# Patient Record
Sex: FEMALE | Race: WHITE | Employment: OTHER | URBAN - METROPOLITAN AREA
[De-identification: names, ages, dates, MRNs, and addresses within clinical notes are randomized per-mention and may not be internally consistent; named-entity substitution may affect disease eponyms.]

---

## 2017-11-07 ENCOUNTER — APPOINTMENT (OUTPATIENT)
Dept: GENERAL RADIOLOGY | Age: 67
DRG: 101 | End: 2017-11-07
Payer: MEDICARE

## 2017-11-07 ENCOUNTER — HOSPITAL ENCOUNTER (INPATIENT)
Age: 67
LOS: 1 days | Discharge: HOME | DRG: 101 | End: 2017-11-08
Attending: EMERGENCY MEDICINE | Admitting: INTERNAL MEDICINE
Payer: MEDICARE

## 2017-11-07 DIAGNOSIS — G40.919 BREAKTHROUGH SEIZURE (HCC): ICD-10-CM

## 2017-11-07 DIAGNOSIS — R56.9 SEIZURES (HCC): Primary | ICD-10-CM

## 2017-11-07 DIAGNOSIS — I95.0 IDIOPATHIC HYPOTENSION: ICD-10-CM

## 2017-11-07 DIAGNOSIS — N30.00 ACUTE CYSTITIS WITHOUT HEMATURIA: ICD-10-CM

## 2017-11-07 LAB
ALBUMIN SERPL-MCNC: 4.1 G/DL (ref 3.9–4.9)
ALP BLD-CCNC: 94 U/L (ref 40–130)
ALT SERPL-CCNC: 7 U/L (ref 0–33)
AMORPHOUS: ABNORMAL
ANION GAP SERPL CALCULATED.3IONS-SCNC: 17 MEQ/L (ref 7–13)
AST SERPL-CCNC: 9 U/L (ref 0–35)
BACTERIA: ABNORMAL /HPF
BASOPHILS ABSOLUTE: 0 K/UL (ref 0–0.2)
BASOPHILS RELATIVE PERCENT: 0.5 %
BILIRUB SERPL-MCNC: 0.1 MG/DL (ref 0–1.2)
BILIRUBIN URINE: ABNORMAL
BLOOD, URINE: NEGATIVE
BUN BLDV-MCNC: 20 MG/DL (ref 8–23)
CALCIUM SERPL-MCNC: 9.9 MG/DL (ref 8.6–10.2)
CHLORIDE BLD-SCNC: 90 MEQ/L (ref 98–107)
CLARITY: ABNORMAL
CO2: 23 MEQ/L (ref 22–29)
COLOR: ABNORMAL
CREAT SERPL-MCNC: 1.16 MG/DL (ref 0.5–0.9)
CREATININE URINE: 36.2 MG/DL
EOSINOPHILS ABSOLUTE: 0.1 K/UL (ref 0–0.7)
EOSINOPHILS RELATIVE PERCENT: 1 %
EPITHELIAL CELLS, UA: ABNORMAL /HPF
GFR AFRICAN AMERICAN: 56.6
GFR NON-AFRICAN AMERICAN: 46.8
GLOBULIN: 2.2 G/DL (ref 2.3–3.5)
GLUCOSE BLD-MCNC: 179 MG/DL (ref 74–109)
GLUCOSE URINE: NEGATIVE MG/DL
HCT VFR BLD CALC: 39 % (ref 37–47)
HEMOGLOBIN: 13.3 G/DL (ref 12–16)
KETONES, URINE: ABNORMAL MG/DL
LEUKOCYTE ESTERASE, URINE: ABNORMAL
LYMPHOCYTES ABSOLUTE: 2 K/UL (ref 1–4.8)
LYMPHOCYTES RELATIVE PERCENT: 20.8 %
MCH RBC QN AUTO: 32.9 PG (ref 27–31.3)
MCHC RBC AUTO-ENTMCNC: 34.1 % (ref 33–37)
MCV RBC AUTO: 96.5 FL (ref 82–100)
MONOCYTES ABSOLUTE: 0.9 K/UL (ref 0.2–0.8)
MONOCYTES RELATIVE PERCENT: 9.3 %
MUCUS: PRESENT
NEUTROPHILS ABSOLUTE: 6.5 K/UL (ref 1.4–6.5)
NEUTROPHILS RELATIVE PERCENT: 68.4 %
NITRITE, URINE: POSITIVE
OSMOLALITY URINE: 316 MOSM/KG (ref 300–900)
OSMOLALITY: 269 MOSM/KG (ref 280–303)
PDW BLD-RTO: 12.6 % (ref 11.5–14.5)
PH UA: 6.5 (ref 5–9)
PLATELET # BLD: 245 K/UL (ref 130–400)
POTASSIUM SERPL-SCNC: 3.3 MEQ/L (ref 3.5–5.1)
POTASSIUM, UR: 13.9 MEQ/L
PROTEIN UA: 30 MG/DL
RBC # BLD: 4.04 M/UL (ref 4.2–5.4)
RBC UA: ABNORMAL /HPF (ref 0–2)
SODIUM BLD-SCNC: 130 MEQ/L (ref 132–144)
SODIUM URINE: 73 MEQ/L
SPECIFIC GRAVITY UA: 1.02 (ref 1–1.03)
TOTAL CK: 44 U/L (ref 0–170)
TOTAL PROTEIN: 6.3 G/DL (ref 6.4–8.1)
TROPONIN: <0.01 NG/ML (ref 0–0.01)
TSH SERPL DL<=0.05 MIU/L-ACNC: 0.9 UIU/ML (ref 0.27–4.2)
URINE REFLEX TO CULTURE: YES
UROBILINOGEN, URINE: 1 E.U./DL
WBC # BLD: 9.5 K/UL (ref 4.8–10.8)
WBC UA: ABNORMAL /HPF (ref 0–5)

## 2017-11-07 PROCEDURE — 71010 XR CHEST PORTABLE: CPT

## 2017-11-07 PROCEDURE — 1210000000 HC MED SURG R&B

## 2017-11-07 PROCEDURE — 99285 EMERGENCY DEPT VISIT HI MDM: CPT

## 2017-11-07 PROCEDURE — 96374 THER/PROPH/DIAG INJ IV PUSH: CPT

## 2017-11-07 PROCEDURE — 6360000002 HC RX W HCPCS: Performed by: EMERGENCY MEDICINE

## 2017-11-07 PROCEDURE — 81001 URINALYSIS AUTO W/SCOPE: CPT

## 2017-11-07 PROCEDURE — 84133 ASSAY OF URINE POTASSIUM: CPT

## 2017-11-07 PROCEDURE — 2580000003 HC RX 258: Performed by: EMERGENCY MEDICINE

## 2017-11-07 PROCEDURE — 87086 URINE CULTURE/COLONY COUNT: CPT

## 2017-11-07 PROCEDURE — 82570 ASSAY OF URINE CREATININE: CPT

## 2017-11-07 PROCEDURE — 6370000000 HC RX 637 (ALT 250 FOR IP): Performed by: INTERNAL MEDICINE

## 2017-11-07 PROCEDURE — 83930 ASSAY OF BLOOD OSMOLALITY: CPT

## 2017-11-07 PROCEDURE — 36415 COLL VENOUS BLD VENIPUNCTURE: CPT

## 2017-11-07 PROCEDURE — 6360000002 HC RX W HCPCS: Performed by: INTERNAL MEDICINE

## 2017-11-07 PROCEDURE — 87040 BLOOD CULTURE FOR BACTERIA: CPT

## 2017-11-07 PROCEDURE — 85025 COMPLETE CBC W/AUTO DIFF WBC: CPT

## 2017-11-07 PROCEDURE — 84484 ASSAY OF TROPONIN QUANT: CPT

## 2017-11-07 PROCEDURE — 84300 ASSAY OF URINE SODIUM: CPT

## 2017-11-07 PROCEDURE — 80053 COMPREHEN METABOLIC PANEL: CPT

## 2017-11-07 PROCEDURE — 95816 EEG AWAKE AND DROWSY: CPT

## 2017-11-07 PROCEDURE — 82550 ASSAY OF CK (CPK): CPT

## 2017-11-07 PROCEDURE — 84443 ASSAY THYROID STIM HORMONE: CPT

## 2017-11-07 PROCEDURE — 96375 TX/PRO/DX INJ NEW DRUG ADDON: CPT

## 2017-11-07 PROCEDURE — 93005 ELECTROCARDIOGRAM TRACING: CPT

## 2017-11-07 PROCEDURE — 83935 ASSAY OF URINE OSMOLALITY: CPT

## 2017-11-07 PROCEDURE — 6370000000 HC RX 637 (ALT 250 FOR IP): Performed by: PSYCHIATRY & NEUROLOGY

## 2017-11-07 PROCEDURE — 2500000003 HC RX 250 WO HCPCS: Performed by: INTERNAL MEDICINE

## 2017-11-07 RX ORDER — OXCARBAZEPINE 300 MG/1
300 TABLET, FILM COATED ORAL NIGHTLY
Status: DISCONTINUED | OUTPATIENT
Start: 2017-11-07 | End: 2017-11-07

## 2017-11-07 RX ORDER — OXCARBAZEPINE 300 MG/1
600 TABLET, FILM COATED ORAL DAILY
Status: DISCONTINUED | OUTPATIENT
Start: 2017-11-07 | End: 2017-11-07

## 2017-11-07 RX ORDER — SODIUM CHLORIDE 0.9 % (FLUSH) 0.9 %
10 SYRINGE (ML) INJECTION PRN
Status: DISCONTINUED | OUTPATIENT
Start: 2017-11-07 | End: 2017-11-08 | Stop reason: HOSPADM

## 2017-11-07 RX ORDER — CETIRIZINE HYDROCHLORIDE 10 MG/1
10 TABLET ORAL DAILY
Status: DISCONTINUED | OUTPATIENT
Start: 2017-11-07 | End: 2017-11-08 | Stop reason: HOSPADM

## 2017-11-07 RX ORDER — 0.9 % SODIUM CHLORIDE 0.9 %
1000 INTRAVENOUS SOLUTION INTRAVENOUS ONCE
Status: COMPLETED | OUTPATIENT
Start: 2017-11-07 | End: 2017-11-07

## 2017-11-07 RX ORDER — ONDANSETRON 2 MG/ML
INJECTION INTRAMUSCULAR; INTRAVENOUS
Status: DISPENSED
Start: 2017-11-07 | End: 2017-11-07

## 2017-11-07 RX ORDER — METOPROLOL SUCCINATE 50 MG/1
50 TABLET, EXTENDED RELEASE ORAL DAILY
COMMUNITY

## 2017-11-07 RX ORDER — DULOXETIN HYDROCHLORIDE 60 MG/1
60 CAPSULE, DELAYED RELEASE ORAL DAILY
COMMUNITY

## 2017-11-07 RX ORDER — IBUPROFEN 400 MG/1
400 TABLET ORAL EVERY 6 HOURS PRN
Status: DISCONTINUED | OUTPATIENT
Start: 2017-11-07 | End: 2017-11-08 | Stop reason: HOSPADM

## 2017-11-07 RX ORDER — METOPROLOL SUCCINATE 50 MG/1
50 TABLET, EXTENDED RELEASE ORAL DAILY
Status: DISCONTINUED | OUTPATIENT
Start: 2017-11-07 | End: 2017-11-08 | Stop reason: HOSPADM

## 2017-11-07 RX ORDER — DULOXETIN HYDROCHLORIDE 60 MG/1
60 CAPSULE, DELAYED RELEASE ORAL DAILY
Status: DISCONTINUED | OUTPATIENT
Start: 2017-11-07 | End: 2017-11-08 | Stop reason: HOSPADM

## 2017-11-07 RX ORDER — OXCARBAZEPINE 300 MG/1
600 TABLET, FILM COATED ORAL 2 TIMES DAILY
Status: DISCONTINUED | OUTPATIENT
Start: 2017-11-07 | End: 2017-11-08 | Stop reason: HOSPADM

## 2017-11-07 RX ORDER — POTASSIUM CHLORIDE 20MEQ/15ML
40 LIQUID (ML) ORAL ONCE
Status: COMPLETED | OUTPATIENT
Start: 2017-11-07 | End: 2017-11-07

## 2017-11-07 RX ORDER — ASPIRIN 81 MG/1
81 TABLET, CHEWABLE ORAL DAILY
COMMUNITY

## 2017-11-07 RX ORDER — LORAZEPAM 2 MG/ML
1 INJECTION INTRAMUSCULAR ONCE
Status: COMPLETED | OUTPATIENT
Start: 2017-11-07 | End: 2017-11-07

## 2017-11-07 RX ORDER — ONDANSETRON 2 MG/ML
4 INJECTION INTRAMUSCULAR; INTRAVENOUS ONCE
Status: COMPLETED | OUTPATIENT
Start: 2017-11-07 | End: 2017-11-07

## 2017-11-07 RX ORDER — ACETAMINOPHEN 325 MG/1
650 TABLET ORAL EVERY 6 HOURS PRN
Status: DISCONTINUED | OUTPATIENT
Start: 2017-11-07 | End: 2017-11-08 | Stop reason: HOSPADM

## 2017-11-07 RX ORDER — OXCARBAZEPINE 300 MG/1
300 TABLET, FILM COATED ORAL NIGHTLY
Status: ON HOLD | COMMUNITY
Start: 2017-11-07 | End: 2017-11-08 | Stop reason: HOSPADM

## 2017-11-07 RX ORDER — LEVETIRACETAM 500 MG/1
500 TABLET ORAL 2 TIMES DAILY
Status: DISCONTINUED | OUTPATIENT
Start: 2017-11-07 | End: 2017-11-07

## 2017-11-07 RX ORDER — SODIUM CHLORIDE 0.9 % (FLUSH) 0.9 %
10 SYRINGE (ML) INJECTION EVERY 12 HOURS SCHEDULED
Status: DISCONTINUED | OUTPATIENT
Start: 2017-11-07 | End: 2017-11-08 | Stop reason: HOSPADM

## 2017-11-07 RX ORDER — SODIUM CHLORIDE 9 MG/ML
INJECTION, SOLUTION INTRAVENOUS CONTINUOUS
Status: DISCONTINUED | OUTPATIENT
Start: 2017-11-07 | End: 2017-11-08 | Stop reason: ALTCHOICE

## 2017-11-07 RX ORDER — ATORVASTATIN CALCIUM 10 MG/1
10 TABLET, FILM COATED ORAL DAILY
Status: DISCONTINUED | OUTPATIENT
Start: 2017-11-07 | End: 2017-11-08 | Stop reason: HOSPADM

## 2017-11-07 RX ORDER — ASPIRIN 81 MG/1
81 TABLET, CHEWABLE ORAL DAILY
Status: DISCONTINUED | OUTPATIENT
Start: 2017-11-07 | End: 2017-11-08 | Stop reason: HOSPADM

## 2017-11-07 RX ORDER — ONDANSETRON 2 MG/ML
4 INJECTION INTRAMUSCULAR; INTRAVENOUS EVERY 6 HOURS PRN
Status: DISCONTINUED | OUTPATIENT
Start: 2017-11-07 | End: 2017-11-08 | Stop reason: HOSPADM

## 2017-11-07 RX ORDER — OXCARBAZEPINE 600 MG/1
600 TABLET, FILM COATED ORAL DAILY
Status: ON HOLD | COMMUNITY
End: 2017-11-08

## 2017-11-07 RX ORDER — ATORVASTATIN CALCIUM 10 MG/1
10 TABLET, FILM COATED ORAL DAILY
COMMUNITY

## 2017-11-07 RX ORDER — NICOTINE 21 MG/24HR
1 PATCH, TRANSDERMAL 24 HOURS TRANSDERMAL DAILY
Status: DISCONTINUED | OUTPATIENT
Start: 2017-11-07 | End: 2017-11-08 | Stop reason: HOSPADM

## 2017-11-07 RX ADMIN — ACETAMINOPHEN 650 MG: 325 TABLET ORAL at 22:49

## 2017-11-07 RX ADMIN — ONDANSETRON 4 MG: 2 INJECTION INTRAMUSCULAR; INTRAVENOUS at 02:20

## 2017-11-07 RX ADMIN — OXCARBAZEPINE 600 MG: 300 TABLET ORAL at 20:37

## 2017-11-07 RX ADMIN — ENOXAPARIN SODIUM 40 MG: 40 INJECTION, SOLUTION INTRAVENOUS; SUBCUTANEOUS at 09:16

## 2017-11-07 RX ADMIN — CHOLECALCIFEROL CAP 125 MCG (5000 UNIT) 5000 UNITS: 125 CAP at 09:17

## 2017-11-07 RX ADMIN — DULOXETINE HYDROCHLORIDE 60 MG: 60 CAPSULE, DELAYED RELEASE ORAL at 09:19

## 2017-11-07 RX ADMIN — OXCARBAZEPINE 600 MG: 300 TABLET ORAL at 10:53

## 2017-11-07 RX ADMIN — CETIRIZINE HYDROCHLORIDE 10 MG: 10 TABLET, FILM COATED ORAL at 15:57

## 2017-11-07 RX ADMIN — SODIUM CHLORIDE: 9 INJECTION, SOLUTION INTRAVENOUS at 02:24

## 2017-11-07 RX ADMIN — ASPIRIN 81 MG 81 MG: 81 TABLET ORAL at 09:19

## 2017-11-07 RX ADMIN — GLUCAGON HYDROCHLORIDE 1 MG: 1 INJECTION, POWDER, FOR SOLUTION INTRAMUSCULAR; INTRAVENOUS; SUBCUTANEOUS at 06:38

## 2017-11-07 RX ADMIN — SODIUM CHLORIDE 1000 ML: 9 INJECTION, SOLUTION INTRAVENOUS at 03:07

## 2017-11-07 RX ADMIN — ATORVASTATIN CALCIUM 10 MG: 10 TABLET, FILM COATED ORAL at 09:17

## 2017-11-07 RX ADMIN — CEFTRIAXONE 1 G: 1 INJECTION, POWDER, FOR SOLUTION INTRAMUSCULAR; INTRAVENOUS at 04:52

## 2017-11-07 RX ADMIN — LORAZEPAM 1 MG: 2 INJECTION INTRAMUSCULAR; INTRAVENOUS at 02:25

## 2017-11-07 RX ADMIN — SODIUM CHLORIDE: 9 INJECTION, SOLUTION INTRAVENOUS at 06:13

## 2017-11-07 RX ADMIN — POTASSIUM CHLORIDE 40 MEQ: 20 SOLUTION ORAL at 06:38

## 2017-11-07 RX ADMIN — METOPROLOL SUCCINATE 50 MG: 50 TABLET, FILM COATED, EXTENDED RELEASE ORAL at 09:18

## 2017-11-07 ASSESSMENT — PAIN SCALES - GENERAL: PAINLEVEL_OUTOF10: 6

## 2017-11-07 ASSESSMENT — ENCOUNTER SYMPTOMS
SHORTNESS OF BREATH: 0
ABDOMINAL DISTENTION: 0
ABDOMINAL PAIN: 0
PHOTOPHOBIA: 0
VOMITING: 0
WHEEZING: 0
CHEST TIGHTNESS: 0
EYE DISCHARGE: 0
NAUSEA: 1
SORE THROAT: 0
COUGH: 0

## 2017-11-07 NOTE — ED TRIAGE NOTES
Pt here for seizure. Pt was diagnosed with seizure disorder in august of this year. Pt is on seizure med tryliptal unsure of dose. Pt awake answers questions appropriately.

## 2017-11-07 NOTE — ED PROVIDER NOTES
Brittney Orozco  eMERGENCY dEPARTMENT eNCOUnter      Pt Name: Becka Hurtado  MRN: [id number]  [place] 1950  Date of evaluation: 11/7/2017  Provider: Omar Hart MD    CHIEF COMPLAINT       Chief Complaint   Patient presents with    Seizures     pt to room 3 via squad from home after having a seizure witnessed by her daughter pt was diagnosed with  seizure disorder august of 2017         HISTORY OF PRESENT ILLNESS   (Location/Symptom, Timing/Onset, Context/Setting, Quality, Duration, Modifying Factors, Severity)  Note limiting factors. Becka Hurtado is a 74 y.o. female who presents to the emergency department After having a seizure this morning. He had gotten up to give her grandbaby a bottle and didn't feel well so she laid on the floor and then had a seizure. This was witnessed by the daughter for body seizure. Patient was diagnosed the first seizure in August 2017 at full workup. She is now complaining of nausea and overall worn out    HPI    Nursing Notes were reviewed. REVIEW OF SYSTEMS    (2-9 systems for level 4, 10 or more for level 5)     Review of Systems   Constitutional: Positive for fatigue. Negative for chills and diaphoresis. HENT: Negative for congestion, ear pain, mouth sores and sore throat. Eyes: Negative for photophobia and discharge. Respiratory: Negative for cough, chest tightness, shortness of breath and wheezing. Cardiovascular: Negative for chest pain and palpitations. Gastrointestinal: Positive for nausea. Negative for abdominal distention, abdominal pain and vomiting. Endocrine: Negative for cold intolerance. Genitourinary: Negative for difficulty urinating. Musculoskeletal: Negative for arthralgias. Skin: Negative for pallor and rash. Allergic/Immunologic: Negative for immunocompromised state. Neurological: Positive for seizures. Negative for dizziness and syncope. Hematological: Negative for adenopathy.    Psychiatric/Behavioral: Negative for agitation and hallucinations. All other systems reviewed and are negative. Except as noted above the remainder of the review of systems was reviewed and negative. PAST MEDICAL HISTORY     Past Medical History:   Diagnosis Date    Depression     Hyperlipidemia     Hypertension     Seizures ([facility] [address]          SURGICAL HISTORY       Past Surgical History:   Procedure Laterality Date    COLON SURGERY           CURRENT MEDICATIONS       Discharge Medication List as of 11/8/2017 12:51 PM      CONTINUE these medications which have NOT CHANGED    Details   aspirin 81 MG chewable tablet Take 81 mg by mouth dailyHistorical Med      DULoxetine (CYMBALTA) 60 MG extended release capsule Take 60 mg by mouth dailyHistorical Med      atorvastatin (LIPITOR) 10 MG tablet Take 10 mg by mouth dailyHistorical Med      metoprolol succinate (TOPROL XL) 50 MG extended release tablet Take 50 mg by mouth dailyHistorical Med      Cholecalciferol (VITAMIN D3) 5000 units TABS Take 5,000 Units by mouth dailyHistorical Med             ALLERGIES     Percocet [oxycodone-acetaminophen]    FAMILY HISTORY     History reviewed. No pertinent family history.        SOCIAL HISTORY       Social History     Social History    Marital status:      Spouse name: N/A    Number of children: N/A    Years of education: N/A     Social History Main Topics    Smoking status: Light Tobacco Smoker    Smokeless tobacco: Never Used    Alcohol use No    Drug use: No    Sexual activity: Not Asked     Other Topics Concern    None     Social History Narrative    None       SCREENINGS   NIH Stroke Scale  NIH Stroke Scale Assessed: NoGlasgow Coma Scale  Eye Opening: Spontaneous  Best Verbal Response: Oriented  Best Motor Response: Obeys commands  [place] Coma Scale Score: 15        PHYSICAL EXAM    (up to 7 for level 4, 8 or more for level 5)     ED Triage Vitals [11/07/17 0202]   BP Temp Temp src Pulse Resp SpO2 Height Weight   (!) 94/59 -- -- 58 16 97 % 5' 6.5\" (1.689 m) 192 lb (87.1 kg)       Physical Exam   Constitutional: She is oriented to person, place, and time. She appears well-developed. No distress. HENT:   Head: Normocephalic. Nose: Nose normal.   Eyes: Conjunctivae are normal. Pupils are equal, round, and reactive to light. Neck: Normal range of motion. Neck supple. Cardiovascular: Normal rate, regular rhythm, normal heart sounds and intact distal pulses. Pulmonary/Chest: Effort normal and breath sounds normal.   Abdominal: Soft. Bowel sounds are normal. There is no tenderness. There is no guarding. Musculoskeletal: Normal range of motion. Neurological: She is alert and oriented to person, place, and time. Skin: Skin is warm and dry. Psychiatric: She has a normal mood and affect. Nursing note and vitals reviewed. DIAGNOSTIC RESULTS     EKG: All EKG's are interpreted by the Emergency Department Physician who either signs or Co-signs this chart in the absence of a cardiologist.    EKG shows sinus bradycardia rate of 58 with first-degree AV block. No acute ST or T-wave changes. There is a prolonged QT. No other acute findings. Normal axis. Abnormal EKG    RADIOLOGY:   Non-plain film images such as CT, Ultrasound and MRI are read by the radiologist. Plain radiographic images are visualized and preliminarily interpreted by the emergency physician with the below findings:    Chest x-ray is unremarkable. No signs of pneumonia    Interpretation per the Radiologist below, if available at the time of this note:    XR Chest Portable   Final Result      Right lower lung atelectasis/pneumonia.             ED BEDSIDE ULTRASOUND:   Performed by ED Physician - none    LABS:  Labs Reviewed   COMPREHENSIVE METABOLIC PANEL - Abnormal; Notable for the following:        Result Value    Sodium 130 (*)     Potassium 3.3 (*)     Chloride 90 (*)     Anion Gap 17 (*)     Glucose 179 (*)     CREATININE 1.16 (*)     GFR 11/07/17 06:17  ANTIBIOTICS AT RUPAL.:                      RECEIVED :  11/07/17 06:29   TROPONIN   CK   SODIUM, URINE, RANDOM   POTASSIUM, URINE, RANDOM   TSH WITHOUT REFLEX   OSMOLALITY, URINE   MAGNESIUM   LACTIC ACID, PLASMA   CREATININE, RANDOM URINE   CREATININE, RANDOM URINE       All other labs were within normal range or not returned as of this dictation. EMERGENCY DEPARTMENT COURSE and DIFFERENTIAL DIAGNOSIS/MDM:   Vitals:    Vitals:    11/08/17 0030 11/08/17 0346 11/08/17 0600 11/08/17 0841   BP: 132/72 (!) 142/83  (!) 155/88   Pulse: 71 66  64   Resp: 16 18  18   Temp: 98.1 °F (36.7 °C) 97.9 °F (36.6 °C)  97.9 °F (36.6 °C)   TempSrc: Oral Oral  Oral   SpO2: 96% 97%  97%   Weight:   197 lb 5 oz (89.5 kg)    Height:             ED Course      MDM on recheck patient is feeling somewhat better however her blood pressure remains in the lower range. The best blood pressure would've gotten his most recent which is 95/62. Patient's had a history of long-standing hypertension currently on metoprolol. With her complaints of dizzy and lightheaded feeling at home and then persistent hypotension without other etiology recommendation is to stop her metoprolol and admit her to the hospital looking for a clinical response. Patient does have urinary tract infection but no signs of sepsis. They'll also be treated in the hospital    Critical care time this patient is 38 minutes excluding separate billable procedures. CONSULTS:  IP CONSULT TO SPIRITUAL SERVICES  IP CONSULT TO NEUROLOGY    PROCEDURES:  Unless otherwise noted below, none     Procedures    FINAL IMPRESSION      1. Seizures ([address]    2. Acute cystitis without hematuria    3. Idiopathic hypotension    4.  Breakthrough seizure [facility])          DISPOSITION/PLAN   DISPOSITION Admitted    PATIENT REFERRED TO:  Cheikh Ge DO  [facility] [address]  [phone]    Schedule an appointment as soon as possible for a visit in 1 days  Post hospital follow up    Efrain Singh MD  [address] [place] [address]  [phone]    Call in 1 week  For follow up      Abundio5 Irma Sanches:  Discharge Medication List as of 11/8/2017 12:51 PM      START taking these medications    Details   ciprofloxacin (CIPRO) 500 MG tablet Take 1 tablet by mouth 2 times daily for 3 days, Disp-6 tablet, R-0Normal                (Please note that portions of this note were completed with a voice recognition program.  Efforts were made to edit the dictations but occasionally words are mis-transcribed.)    Josseline Schmidt MD (electronically signed)  Attending Emergency Physician          Josseline Schmidt MD  11/07/17 1770       Josseline Schmidt MD  11/07/17 MD Myesha  11/19/17 9032

## 2017-11-07 NOTE — ED NOTES
Seizure precautions maintained padded side rails intact     Oly Keller, [address] [place] [address]  11/07/17 9776

## 2017-11-07 NOTE — CONSULTS
Consult to Neurology  Consult performed by: Nalini Aquino  Consult ordered by: Amanda Collier  Seizures  Seen in [address] [place] [address]  DR Albino Gifford in [place], obtained 7 day home vidoe eeg, results not available as yet  Plan increase Trileptal and d/c tomorrow  Watch Na,   If remains low will need to change  to Lamictal,

## 2017-11-07 NOTE — CONSULTS
[address]                     [address], [address] [facility]                                 CONSULTATION    PATIENT NAME: Moira Arroyo                   :         1950  MED REC NO:   [id number]                            ROOM:       H450  ACCOUNT NO:   [de-identified]                           ADMIT DATE:  2017  PROVIDER:     Ayaz Chavez MD    CONSULT DATE:  2017    ATTENDING:  Dr. Lois Connors. HISTORY OF PRESENT ILLNESS:  This is a 73-year-old right-handed female  with history of seizure disorder. The patient was initially admitted  to [facility] in August with seizure. She has had few  seizures, first seizure was six years ago. There was no _____. She  did not seek any attention at that time. She has only been on  Trileptal since August.  She was seen by Dr. Ivy Rojas. She had a 7-day  video monitor recording at home, the results are not available; this  was done two weeks ago. She did try to see me, but there was no  appointment. Her description was that for grand mal seizure is the  daughter had witnessed this. She has complete evaluation including  MRIs and EEGs done and therefore, we will not repeat at this time. She denies any recent falls, injuries, trauma. She is not complaining  of bleeding, bruising, choking, drooling, falls. She is not  complaining of any chest pain, shortness of breath. Not complaining  of headache. No fever. She is not complaining of any chest pain. She has no other history. PAST MEDICAL HISTORY:  Remarkable for seizure as described very well  in my note. There is no other past medical history listed. SOCIAL HISTORY:  She lives at home. MEDICATIONS:  She is on aspirin and atorvastatin. She is on  metoprolol, likely representing that she has hypertension. PHYSICAL EXAMINATION:  GENERAL:  She is in no acute distress. General examination reveals no  skin lesions or skin marks.   NECK: Supple. There are no meningeal signs. CARDIOVASCULAR SYSTEM:  S1 and S2 are normal.  No murmurs appreciated. No carotid bruits. RESPIRATORY SYSTEM:  Clear to auscultation. EXTREMITIES:  There is no pedal edema. There is no cyanosis or  clubbing. Examination of extremities reveals no pronator drift. Fine  finger movements are symmetrical.  Strength is 5/5. Reflexes are 2+. Gait and stance are normal.  CNS:  She is awake and alert and oriented to self, place, month, and  year. Pupils are equal and reactive. Eye movements are conjugate. There is no facial asymmetry. Speech is normal.  Tongue is midline. Palate moves symmetrically. LABORATORY DATA:  Lab examination is therefore reviewed. Her initial  comprehensive metabolic profile shows sodium 130, potassium 3.3, BUN  of 20, creatinine 1.16. Her prolactin level was 42 and elevated. Her  B12 levels done in 2016 were low. IMPRESSION:  Generalized seizures which have been recently diagnosed. She was started on Trileptal in August which was further titrated to  900 mg.  I recommend that we change it to 1200 mg. The patient does  have some hyponatremia which needs to be followed. This is a common  side effect of Trileptal.  If this continues, the patient may need to  be changed to Lamictal.  She is followed by Dr. Joe Sanabria at [facility].  She had a 7-day video monitoring at home, the results are  not available. This was done two weeks ago. She had a complete  evaluation in [place] in August and therefore, we will not repeat any  testing. We will keep her on this medication and if she has recurrent  seizures, she will best be treated with Lamictal.    We will follow with you. Thank you Dr. Lalitha Mcclellan for asking us to assist in the care of this  patient.         Chaz Arredondo MD    D: 11/07/2017 8:46:15       T: 11/07/2017 9:44:39     [email or web address]  Job#: [id number]     Doc#: [id number]

## 2017-11-07 NOTE — PROGRESS NOTES
Hospitalist Progress Note      PCP: Bee Hinojosa DO    Date of Admission: 2017      Subjective:     Medications:  Reviewed    Infusion Medications    sodium chloride 100 mL/hr at 17 8779     Scheduled Medications    sodium chloride flush  10 mL Intravenous 2 times per day    enoxaparin  40 mg Subcutaneous Daily    [START ON 2017] pneumococcal 13-valent conjugate  0.5 mL Intramuscular Once    nicotine  1 patch Transdermal Daily    [START ON 2017] cefTRIAXone (ROCEPHIN) IV  1 g Intravenous Q24H    aspirin  81 mg Oral Daily    atorvastatin  10 mg Oral Daily    vitamin D  5,000 Units Oral Daily    DULoxetine  60 mg Oral Daily    metoprolol succinate  50 mg Oral Daily    OXcarbazepine  600 mg Oral BID     PRN Meds: sodium chloride flush, magnesium hydroxide, ondansetron, ibuprofen      Intake/Output Summary (Last 24 hours) at 17 1110  Last data filed at 17 0408   Gross per 24 hour   Intake             2000 ml   Output                0 ml   Net             2000 ml       Exam:  /63   Pulse 75   Temp 97.9 °F (36.6 °C) (Oral)   Resp 18   Ht 5' 6.5\" (1.689 m)   Wt 192 lb (87.1 kg)   SpO2 98%   BMI 30.53 kg/m²     Average, Min, and Max for last 24 hours Vitals:  TEMPERATURE:  Temp  Av.6 °F (36.4 °C)  Min: 97.5 °F (36.4 °C)  Max: 97.9 °F (36.6 °C)    RESPIRATIONS RANGE: Resp  Av.3  Min: 15  Max: 21    PULSE RANGE: Pulse  Av.9  Min: 58  Max: 75    BLOOD PRESSURE RANGE:  Systolic (82ZSS), XHO:81 , Min:90 , YB   ; Diastolic (39AUX), JASON:58, Min:53, Max:66      PULSE OXIMETRY RANGE: SpO2  Av.2 %  Min: 97 %  Max: 100 %    I/O last 3 completed shifts: In:  [I.V.:]  Out: -     General appearance: No apparent distress, appears stated age and cooperative. HEENT: Pupils equal, round, and reactive to light. Conjunctivae/corneas clear. Neck: Supple, with full range of motion. No jugular venous distention. Trachea midline.   Respiratory:  Normal will consider discontinuation of these medications. Administering NSIV, monitoring closely. 5. Hypokalemia: replete and monitor   6. DVT PPX lovenox SQ 40mg. 7.  BIPOLAR D/O continue trileptal and cymbalta.       DVT Prophylaxis: via lovenox  Diet: DIET GENERAL;  Code Status: Full Code      Electronically signed by Ariel Montesinos MD on 11/7/2017 at 11:10 AM

## 2017-11-07 NOTE — H&P
Historical Provider, MD   DULoxetine (CYMBALTA) 60 MG extended release capsule Take 60 mg by mouth daily   Yes Historical Provider, MD   atorvastatin (LIPITOR) 10 MG tablet Take 10 mg by mouth daily   Yes Historical Provider, MD   metoprolol succinate (TOPROL XL) 50 MG extended release tablet Take 50 mg by mouth daily   Yes Historical Provider, MD   OXcarbazepine (TRILEPTAL) 600 MG tablet Take 600 mg by mouth daily   Yes Historical Provider, MD   Cholecalciferol (VITAMIN D3) 5000 units TABS Take 5,000 Units by mouth daily 11/7/17  Yes Historical Provider, MD       Allergies:  Percocet [oxycodone-acetaminophen]    Social History:   TOBACCO:   reports that she has been smoking. She has never used smokeless tobacco.  Continues to smoke  ETOH:   reports that she does not drink alcohol. OCCUPATION:  None  Lives at home. Lives at. home. Travel none. Family History:   History reviewed. No pertinent family history. REVIEW OF SYSTEMS:  Ten systems reviewed and negative except for as above. Physical Exam:    Vitals: BP 96/66   Pulse 67   Temp 97.5 °F (36.4 °C) (Oral)   Resp 16   Ht 5' 6.5\" (1.689 m)   Wt 192 lb (87.1 kg)   SpO2 99%   BMI 30.53 kg/m²   Constitutional: alert, appears stated age and cooperative  Skin: Skin color, texture, turgor normal. No rashes or lesions. Bilateral digital clubbing of the fingers  Eyes:Eye: Normal external eye, conjunctiva, HIREN. ENT: Head: Normocephalic, no lesions, without obvious abnormality.   Neck: no adenopathy, no carotid bruit, no JVD, supple, symmetrical, trachea midline and thyroid not enlarged, symmetric, no tenderness/mass/nodules  Respiratory: clear to auscultation bilaterally  Cardiovascular: regular rate and rhythm, S1, S2 normal, no murmur, click, rub or gallop  Gastrointestinal: soft, non-tender; bowel sounds normal; no masses,  no organomegaly  Genitourinary: Deferred  Musculoskeletal:extremities normal, atraumatic, no cyanosis or edema  Neurologic:

## 2017-11-08 VITALS
SYSTOLIC BLOOD PRESSURE: 155 MMHG | HEART RATE: 64 BPM | BODY MASS INDEX: 30.97 KG/M2 | HEIGHT: 67 IN | WEIGHT: 197.31 LBS | OXYGEN SATURATION: 97 % | TEMPERATURE: 97.9 F | RESPIRATION RATE: 18 BRPM | DIASTOLIC BLOOD PRESSURE: 88 MMHG

## 2017-11-08 LAB
ANION GAP SERPL CALCULATED.3IONS-SCNC: 12 MEQ/L (ref 7–13)
BASOPHILS ABSOLUTE: 0 K/UL (ref 0–0.2)
BASOPHILS RELATIVE PERCENT: 0.7 %
BUN BLDV-MCNC: 7 MG/DL (ref 8–23)
CALCIUM SERPL-MCNC: 8.3 MG/DL (ref 8.6–10.2)
CHLORIDE BLD-SCNC: 101 MEQ/L (ref 98–107)
CO2: 21 MEQ/L (ref 22–29)
CREAT SERPL-MCNC: 0.42 MG/DL (ref 0.5–0.9)
EKG ATRIAL RATE: 58 BPM
EKG ATRIAL RATE: 70 BPM
EKG P AXIS: 53 DEGREES
EKG P AXIS: 54 DEGREES
EKG P-R INTERVAL: 198 MS
EKG P-R INTERVAL: 224 MS
EKG Q-T INTERVAL: 416 MS
EKG Q-T INTERVAL: 500 MS
EKG QRS DURATION: 104 MS
EKG QRS DURATION: 90 MS
EKG QTC CALCULATION (BAZETT): 449 MS
EKG QTC CALCULATION (BAZETT): 490 MS
EKG R AXIS: -3 DEGREES
EKG R AXIS: -3 DEGREES
EKG T AXIS: 43 DEGREES
EKG T AXIS: 44 DEGREES
EKG VENTRICULAR RATE: 58 BPM
EKG VENTRICULAR RATE: 70 BPM
EOSINOPHILS ABSOLUTE: 0.1 K/UL (ref 0–0.7)
EOSINOPHILS RELATIVE PERCENT: 1.2 %
GFR AFRICAN AMERICAN: >60
GFR NON-AFRICAN AMERICAN: >60
GLUCOSE BLD-MCNC: 85 MG/DL (ref 74–109)
HCT VFR BLD CALC: 34.9 % (ref 37–47)
HEMOGLOBIN: 11.8 G/DL (ref 12–16)
LACTIC ACID: 0.6 MMOL/L (ref 0.5–2.2)
LYMPHOCYTES ABSOLUTE: 1.9 K/UL (ref 1–4.8)
LYMPHOCYTES RELATIVE PERCENT: 42.8 %
MAGNESIUM: 1.9 MG/DL (ref 1.7–2.3)
MCH RBC QN AUTO: 32.3 PG (ref 27–31.3)
MCHC RBC AUTO-ENTMCNC: 33.8 % (ref 33–37)
MCV RBC AUTO: 95.8 FL (ref 82–100)
MONOCYTES ABSOLUTE: 0.5 K/UL (ref 0.2–0.8)
MONOCYTES RELATIVE PERCENT: 10.9 %
NEUTROPHILS ABSOLUTE: 2 K/UL (ref 1.4–6.5)
NEUTROPHILS RELATIVE PERCENT: 44.4 %
PDW BLD-RTO: 13.4 % (ref 11.5–14.5)
PLATELET # BLD: 208 K/UL (ref 130–400)
POTASSIUM SERPL-SCNC: 4.1 MEQ/L (ref 3.5–5.1)
RBC # BLD: 3.64 M/UL (ref 4.2–5.4)
SODIUM BLD-SCNC: 134 MEQ/L (ref 132–144)
URINE CULTURE, ROUTINE: NORMAL
WBC # BLD: 4.5 K/UL (ref 4.8–10.8)

## 2017-11-08 PROCEDURE — 6360000002 HC RX W HCPCS: Performed by: INTERNAL MEDICINE

## 2017-11-08 PROCEDURE — 2700000000 HC OXYGEN THERAPY PER DAY

## 2017-11-08 PROCEDURE — 6370000000 HC RX 637 (ALT 250 FOR IP): Performed by: PSYCHIATRY & NEUROLOGY

## 2017-11-08 PROCEDURE — 85025 COMPLETE CBC W/AUTO DIFF WBC: CPT

## 2017-11-08 PROCEDURE — 93005 ELECTROCARDIOGRAM TRACING: CPT

## 2017-11-08 PROCEDURE — 2580000003 HC RX 258: Performed by: EMERGENCY MEDICINE

## 2017-11-08 PROCEDURE — 2580000003 HC RX 258: Performed by: INTERNAL MEDICINE

## 2017-11-08 PROCEDURE — 80048 BASIC METABOLIC PNL TOTAL CA: CPT

## 2017-11-08 PROCEDURE — 90670 PCV13 VACCINE IM: CPT | Performed by: INTERNAL MEDICINE

## 2017-11-08 PROCEDURE — 36415 COLL VENOUS BLD VENIPUNCTURE: CPT

## 2017-11-08 PROCEDURE — 6370000000 HC RX 637 (ALT 250 FOR IP): Performed by: INTERNAL MEDICINE

## 2017-11-08 PROCEDURE — 83605 ASSAY OF LACTIC ACID: CPT

## 2017-11-08 PROCEDURE — 83735 ASSAY OF MAGNESIUM: CPT

## 2017-11-08 PROCEDURE — 93010 ELECTROCARDIOGRAM REPORT: CPT | Performed by: INTERNAL MEDICINE

## 2017-11-08 PROCEDURE — G0009 ADMIN PNEUMOCOCCAL VACCINE: HCPCS | Performed by: INTERNAL MEDICINE

## 2017-11-08 RX ORDER — CIPROFLOXACIN 500 MG/1
500 TABLET, FILM COATED ORAL 2 TIMES DAILY
Qty: 6 TABLET | Refills: 0 | Status: SHIPPED | OUTPATIENT
Start: 2017-11-08 | End: 2017-11-11

## 2017-11-08 RX ORDER — OXCARBAZEPINE 600 MG/1
600 TABLET, FILM COATED ORAL 2 TIMES DAILY
Qty: 60 TABLET | Refills: 1 | Status: SHIPPED | OUTPATIENT
Start: 2017-11-08

## 2017-11-08 RX ADMIN — METOPROLOL SUCCINATE 50 MG: 50 TABLET, FILM COATED, EXTENDED RELEASE ORAL at 12:32

## 2017-11-08 RX ADMIN — DULOXETINE HYDROCHLORIDE 60 MG: 60 CAPSULE, DELAYED RELEASE ORAL at 09:01

## 2017-11-08 RX ADMIN — OXCARBAZEPINE 600 MG: 300 TABLET ORAL at 09:14

## 2017-11-08 RX ADMIN — CETIRIZINE HYDROCHLORIDE 10 MG: 10 TABLET, FILM COATED ORAL at 09:01

## 2017-11-08 RX ADMIN — SODIUM CHLORIDE: 9 INJECTION, SOLUTION INTRAVENOUS at 01:02

## 2017-11-08 RX ADMIN — ENOXAPARIN SODIUM 40 MG: 40 INJECTION, SOLUTION INTRAVENOUS; SUBCUTANEOUS at 09:00

## 2017-11-08 RX ADMIN — CHOLECALCIFEROL CAP 125 MCG (5000 UNIT) 5000 UNITS: 125 CAP at 09:14

## 2017-11-08 RX ADMIN — PNEUMOCOCCAL 13-VALENT CONJUGATE VACCINE 0.5 ML: 2.2; 2.2; 2.2; 2.2; 2.2; 4.4; 2.2; 2.2; 2.2; 2.2; 2.2; 2.2; 2.2 INJECTION, SUSPENSION INTRAMUSCULAR at 12:44

## 2017-11-08 RX ADMIN — ASPIRIN 81 MG 81 MG: 81 TABLET ORAL at 09:01

## 2017-11-08 RX ADMIN — CEFTRIAXONE 1 G: 1 INJECTION, POWDER, FOR SOLUTION INTRAMUSCULAR; INTRAVENOUS at 06:00

## 2017-11-08 NOTE — PROGRESS NOTES
Neurology Follow up    SUBJECTIVE:  NO Headache, double vision,blurry vision,difficulty with speech,difficulty with swallowing,weakness,numbness,pain,nausea,vomitting,chocking,neck pain,dizziness,    PHYSICAL EXAM:    BP (!) 142/83   Pulse 66   Temp 97.9 °F (36.6 °C) (Oral)   Resp 18   Ht 5' 6.5\" (1.689 m)   Wt 197 lb 5 oz (89.5 kg)   SpO2 97%   BMI 31.37 kg/m²   General Appearance:      Mental Status Exam:             Level of Alertness:   awake            Orientation:   person, place, time            Memory:   normal            Fund of Knowledge:  normal            Attention/Concentration:  normal            Language:  normal      Funduscopic Exam:     Cranial Nerves        Cranial nerve II           Visual acuity:  normal           Visual fields:  normal      Cranial nerve III           Pupils:  equal, round, reactive to light      Cranial nerves III, IV, VI           Extraocular Movements: intact      Cranial nerve V           Facial sensation:  intact      Cranial nerve VII           Facial strength: intact      Cranial nerve VIII           Hearing:  intact      Cranial nerve IX           Palate:  intact      Cranial nerve XI         Shoulder shrug:  intact      Cranial nerve XII          Tongue movement:  normal    Motor:    Drift:  absent  Motor exam is symmetrical 5 out of 5 all extremities bilaterally  Tone:  normal  Abnormal Movements:  absent            Sensory:        Pinprick             Right Upper Extremity:  normal             Left Upper Extremity:  normal             Right Lower Extremity:  normal             Left Lower Extremity:  normal           Vibration                         Touch            Proprioception                 Coordination:           Finger/Nose   Right:  normal              Left:  normal          Heel-Knee-Shin                Right:  normal              Left:  normal          Rapid Alternating Movements              Right:  normal              Left:  normal          Gait: Casual:  normal                         Romberg:  normal            Reflexes:             Deep Tendon Reflexes:             Reflexes are 2 +             Plantar response:                Right:  downgoing               Left:  downgoing    Vascular:  Cardiac Exam:  normal         Xr Chest Portable    Result Date: 11/7/2017  Chest one view. HISTORY: Seizure, hypertension. FINDINGS: No prior imaging. Narrowing left acromioclavicular joint. Cardiopericardial silhouette normal for technique. Aorta calcified. An ill-defined area of increased opacity is found at the right lung base. Possible suggestion of air bronchogram. Left lung is clear. Right lower lung atelectasis/pneumonia. Recent Labs      11/07/17   0231  11/08/17   0548   WBC  9.5  4.5*   HGB  13.3  11.8*   PLT  245  208     Recent Labs      11/07/17   0230  11/08/17   0548   NA  130*  134   K  3.3*  4.1   CL  90*  101   CO2  23  21*   BUN  20  7*   CREATININE  1.16*  0.42*   GLUCOSE  179*  85     Recent Labs      11/07/17   0230   BILITOT  0.1   ALKPHOS  94   AST  9   ALT  7     No results found for: PROTIME, INR  No results found for: LITHIUM, DILFRTOT, VALPROATE    ASSESSMENT AND PLAN  Gen.  Seizures, controlled   Trileptal increased  OK d/c   Will f/u Dr Maria Elena Lechuga or us   hyponaremia

## 2017-11-08 NOTE — CARE COORDINATION
SPOKE WITH PATIENT TO VERIFY HER D/C PLAN OF HOME. SHE IS ANTICIPATING HOME TODAY. PAGE 2 OF IMM INITIALED PER PATIENT WITH VERBALIZATION OF UNDERSTANDING. SHE DENIES NEEDS ON D/C.  Electronically signed by Fany Underwood RN on 11/8/2017 at 10:45 AM

## 2017-11-08 NOTE — PROCEDURES
[address]                     [facility], [address] [facility]                         ELECTROENCEPHALOGRAM REPORT    PATIENT NAME: Jovana Barlow                   :         1950  MED REC NO:   [id number]                            ROOM:       E487  ACCOUNT NO:   [de-identified]                           ADMIT DATE:  2017  PROVIDER:     Raven Lopez MD    DATE OF EE2017    ATTENDING:  Dr. Leticia Jensen. HISTORY OF PRESENT ILLNESS:  This is a 56-year-old right-handed female  with a known history of seizure disorder who was admitted with  seizures. The background rhythm of this EEG shows 8 to 9 Hz posterior dominant  rhythm of alpha. This is symmetrical and attenuates with eye opening. EKG is monitored; this is regular. Photic stimulation is performed  without any photic response. There is no photic response to seizures. A drip artifact is seen. Hyperventilation is performed with good  effort with some respiratory artifacts. The record remains  symmetrical throughout. IMPRESSION:  This is a normal well-regulated interictal EEG. Clinical  course is recommended.         Humberto Garibay MD    D: 2017 12:27:35       T: 2017 12:54:21     [email or web address]  Job#: [id number]     Doc#: [id number]

## 2017-11-08 NOTE — PLAN OF CARE
Demonstration of wound healing without infection will improve  Demonstration of wound healing without infection will improve  Outcome: Ongoing    Goal: Absence of urinary tract infection signs and symptoms  Absence of urinary tract infection signs and symptoms  Outcome: Ongoing      Problem: Nutrition Deficit - Risk of:  Goal: Ability to achieve adequate nutritional intake will improve  Ability to achieve adequate nutritional intake will improve  Outcome: Ongoing    Goal: Maintenance of adequate weight for body size and type will improve to within specified parameters  Maintenance of adequate weight for body size and type will improve to within specified parameters  Outcome: Ongoing      Problem: Swallowing - Impaired:  Goal: Able to swallow without choking  Able to swallow without choking  Outcome: Ongoing    Goal: Absence of aspiration  Absence of aspiration  Outcome: Ongoing      Problem: Aspiration - Risk of:  Goal: Absence of aspiration  Absence of aspiration  Outcome: Ongoing      Problem: Respiratory Function - Compromised:  Goal: Absence of pulmonary infection  Absence of pulmonary infection  Outcome: Ongoing    Goal: Levels of oxygenation will improve  Levels of oxygenation will improve  Outcome: Ongoing    Goal: Increase in ventilator-free time  Increase in ventilator-free time  Outcome: Ongoing    Goal: Ability to maintain a clear airway will improve  Ability to maintain a clear airway will improve  Outcome: Ongoing      Problem:  Bowel Function - Altered:  Goal: Bowel elimination is within specified parameters  Bowel elimination is within specified parameters  Outcome: Ongoing    Goal: Control of bowel function will improve  Control of bowel function will improve  Outcome: Ongoing    Goal: Ability to maintain normal bowel function will improve  Ability to maintain normal bowel function will improve  Outcome: Ongoing      Problem: Urinary Elimination - Impaired:  Goal: Urinary elimination within specified parameters  Urinary elimination within specified parameters  Outcome: Ongoing    Goal: Absence of postvoid residual urine  Absence of postvoid residual urine  Outcome: Ongoing    Goal: Absence of incontinence - Urinary  Absence of incontinence - Urinary  Outcome: Ongoing    Goal: Reports of episodes of incontinence will decrease - Urinary  Reports of episodes of incontinence will decrease - Urinary  Outcome: Ongoing      Problem: Self-Care Deficit:  Goal: Ability to perform activities of daily living will improve  Ability to perform activities of daily living will improve  Outcome: Ongoing    Goal: Able to perform ADL with assistance  Able to perform ADL with assistance  Outcome: Ongoing    Goal: Ability to communicate needs accurately will improve - ADL needs  Ability to communicate needs accurately will improve - ADL needs  Outcome: Ongoing    Goal: Able to use self-care assistive device appropriately  Able to use self-care assistive device appropriately  Outcome: Ongoing      Problem: Sexual Dysfunction:  Goal: Expressions of positive opinion of body image will increase  Expressions of positive opinion of body image will increase  Outcome: Ongoing      Problem: Psychosocial Distress:  Goal: Absence of psychosocial distress  Absence of psychosocial distress  Outcome: Ongoing    Goal: Ability to cope will improve  Ability to cope will improve  Outcome: Ongoing    Goal: Ability to identify and utilize available support systems will improve  Ability to identify and utilize available support systems will improve  Outcome: Ongoing      Problem: Disruptive Behavior:  Goal: Knowledge of developmental care interventions  Absence of violence  Outcome: Ongoing    Goal: Decrease in feelings of agitation  Decrease in feelings of agitation  Outcome: Ongoing

## 2017-11-08 NOTE — DISCHARGE SUMMARY
daily for 3 days             DULoxetine (CYMBALTA) 60 MG extended release capsule  Take 60 mg by mouth daily             metoprolol succinate (TOPROL XL) 50 MG extended release tablet  Take 50 mg by mouth daily             OXcarbazepine (TRILEPTAL) 600 MG tablet  Take 1 tablet by mouth 2 times daily             OXcarbazepine (TRILEPTAL) 600 MG tablet  Take 1 tablet by mouth 2 times daily                 Activity: activity as tolerated    Diet: 2 g sodium diet.     Wound Care: none needed    Follow-up:  As directed    Electronically signed by Isaac Navarro MD on 11/8/17 at 6:02 PM

## 2017-11-08 NOTE — PROGRESS NOTES
Hospitalist Progress Note      PCP: Saint Holster, DO    Date of Admission: 2017    Subjective: no complaints voiced    Medications:  Reviewed    Infusion Medications      Scheduled Medications    sodium chloride flush  10 mL Intravenous 2 times per day    enoxaparin  40 mg Subcutaneous Daily    pneumococcal 13-valent conjugate  0.5 mL Intramuscular Once    nicotine  1 patch Transdermal Daily    cefTRIAXone (ROCEPHIN) IV  1 g Intravenous Q24H    aspirin  81 mg Oral Daily    atorvastatin  10 mg Oral Daily    vitamin D  5,000 Units Oral Daily    DULoxetine  60 mg Oral Daily    metoprolol succinate  50 mg Oral Daily    OXcarbazepine  600 mg Oral BID    cetirizine  10 mg Oral Daily     PRN Meds: sodium chloride flush, magnesium hydroxide, ondansetron, ibuprofen, acetaminophen      Intake/Output Summary (Last 24 hours) at 17 1112  Last data filed at 17 0601   Gross per 24 hour   Intake             1785 ml   Output                0 ml   Net             1785 ml       Exam:  BP (!) 155/88   Pulse 64   Temp 97.9 °F (36.6 °C) (Oral)   Resp 18   Ht 5' 6.5\" (1.689 m)   Wt 197 lb 5 oz (89.5 kg)   SpO2 97%   BMI 31.37 kg/m²     Average, Min, and Max for last 24 hours Vitals:  TEMPERATURE:  Temp  Av.2 °F (36.8 °C)  Min: 97.9 °F (36.6 °C)  Max: 98.8 °F (37.1 °C)    RESPIRATIONS RANGE: Resp  Av.7  Min: 16  Max: 18    PULSE RANGE: Pulse  Av.3  Min: 64  Max: 75    BLOOD PRESSURE RANGE:  Systolic (60RSV), USR:067 , Min:115 , SCW:602   ; Diastolic (77ZBJ), JRO:78, Min:72, Max:88      PULSE OXIMETRY RANGE: SpO2  Av.8 %  Min: 96 %  Max: 100 %    I/O last 3 completed shifts: In: 2145 [P.O.:1080; I.V.:1065]  Out: -     General appearance: No apparent distress, appears stated age and cooperative. HEENT: Pupils equal, round, and reactive to light. Conjunctivae/corneas clear. Neck: Supple, with full range of motion. No jugular venous distention. Trachea midline.   Respiratory:  Normal respiratory effort. Clear to auscultation, bilaterally without Rales/Wheezes/Rhonchi. Cardiovascular: Regular rate and rhythm with normal S1/S2 without murmurs, rubs or gallops. Abdomen: Soft, non-tender, non-distended with normal bowel sounds. Musculoskeletal: No clubbing, cyanosis or edema bilaterally. Full range of motion without deformity. Skin: Skin color, texture, turgor normal.  No rashes or lesions.   Neurologic:  grossly non-focal.  Ext : no c/c/e    Labs:     Recent Results (from the past 24 hour(s))   Sodium, urine, random    Collection Time: 11/07/17  6:57 PM   Result Value Ref Range    Sodium, Ur 73 Not Established mEq/L   POTASSIUM, URINE, RANDOM    Collection Time: 11/07/17  6:57 PM   Result Value Ref Range    Potassium, Ur 13.9 Not Established mEq/L   OSMOLALITY, URINE    Collection Time: 11/07/17  6:57 PM   Result Value Ref Range    Osmolality, Ur 316 300 - 900 mOsm/kg   Creatinine, Random Urine    Collection Time: 11/07/17  6:57 PM   Result Value Ref Range    Creatinine, Ur 36.2 Not Established mg/dL   EKG 12 Lead    Collection Time: 11/08/17  4:58 AM   Result Value Ref Range    Ventricular Rate 70 BPM    Atrial Rate 70 BPM    P-R Interval 198 ms    QRS Duration 90 ms    Q-T Interval 416 ms    QTc Calculation (Bazett) 449 ms    P Axis 53 degrees    R Axis -3 degrees    T Axis 44 degrees   CBC auto differential    Collection Time: 11/08/17  5:48 AM   Result Value Ref Range    WBC 4.5 (L) 4.8 - 10.8 K/uL    RBC 3.64 (L) 4.20 - 5.40 M/uL    Hemoglobin 11.8 (L) 12.0 - 16.0 g/dL    Hematocrit 34.9 (L) 37.0 - 47.0 %    MCV 95.8 82.0 - 100.0 fL    MCH 32.3 (H) 27.0 - 31.3 pg    MCHC 33.8 33.0 - 37.0 %    RDW 13.4 11.5 - 14.5 %    Platelets 498 570 - 062 K/uL    Neutrophils % 44.4 %    Lymphocytes % 42.8 %    Monocytes % 10.9 %    Eosinophils % 1.2 %    Basophils % 0.7 %    Neutrophils # 2.0 1.4 - 6.5 K/uL    Lymphocytes # 1.9 1.0 - 4.8 K/uL    Monocytes # 0.5 0.2 - 0.8 K/uL    Eosinophils # 0.1 0.0 -

## 2017-11-12 LAB
BLOOD CULTURE, ROUTINE: NORMAL
CULTURE, BLOOD 2: NORMAL

## 2022-08-11 ENCOUNTER — APPOINTMENT (OUTPATIENT)
Dept: CT IMAGING | Age: 72
End: 2022-08-11
Payer: COMMERCIAL

## 2022-08-11 ENCOUNTER — HOSPITAL ENCOUNTER (EMERGENCY)
Age: 72
Discharge: HOME | End: 2022-08-11
Attending: STUDENT IN AN ORGANIZED HEALTH CARE EDUCATION/TRAINING PROGRAM
Payer: COMMERCIAL

## 2022-08-11 ENCOUNTER — APPOINTMENT (OUTPATIENT)
Dept: GENERAL RADIOLOGY | Age: 72
End: 2022-08-11
Payer: COMMERCIAL

## 2022-08-11 VITALS
RESPIRATION RATE: 15 BRPM | HEIGHT: 66 IN | SYSTOLIC BLOOD PRESSURE: 104 MMHG | OXYGEN SATURATION: 98 % | BODY MASS INDEX: 31.34 KG/M2 | WEIGHT: 195 LBS | HEART RATE: 70 BPM | DIASTOLIC BLOOD PRESSURE: 75 MMHG | TEMPERATURE: 98.7 F
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
## 2022-08-11 DIAGNOSIS — E86.0 DEHYDRATION: ICD-10-CM

## 2022-08-11 DIAGNOSIS — N39.0 URINARY TRACT INFECTION WITHOUT HEMATURIA, SITE UNSPECIFIED: ICD-10-CM

## 2022-08-11 DIAGNOSIS — R42 LIGHTHEADEDNESS: ICD-10-CM

## 2022-08-11 LAB
ALBUMIN SERPL-MCNC: 3.6 G/DL (ref 3.5–4.6)
ALP BLD-CCNC: 53 U/L (ref 40–130)
ALT SERPL-CCNC: 6 U/L (ref 0–33)
ANION GAP SERPL CALCULATED.3IONS-SCNC: 7 MEQ/L (ref 9–15)
AST SERPL-CCNC: 10 U/L (ref 0–35)
BACTERIA: ABNORMAL /HPF
BASOPHILS ABSOLUTE: 0 K/UL (ref 0–0.2)
BASOPHILS RELATIVE PERCENT: 0.7 %
BILIRUB SERPL-MCNC: <0.2 MG/DL (ref 0.2–0.7)
BILIRUBIN URINE: NEGATIVE
BLOOD, URINE: NEGATIVE
BUN BLDV-MCNC: 15 MG/DL (ref 8–23)
CALCIUM SERPL-MCNC: 9.3 MG/DL (ref 8.5–9.9)
CHLORIDE BLD-SCNC: 100 MEQ/L (ref 95–107)
CLARITY: CLEAR
CO2: 28 MEQ/L (ref 20–31)
COLOR: YELLOW
CREAT SERPL-MCNC: 0.96 MG/DL (ref 0.5–0.9)
EOSINOPHILS ABSOLUTE: 0.1 K/UL (ref 0–0.7)
EOSINOPHILS RELATIVE PERCENT: 2.6 %
EPITHELIAL CELLS, UA: ABNORMAL /HPF
GFR AFRICAN AMERICAN: >60
GFR NON-AFRICAN AMERICAN: 57.4
GLOBULIN: 2.3 G/DL (ref 2.3–3.5)
GLUCOSE BLD-MCNC: 93 MG/DL (ref 70–99)
GLUCOSE URINE: NEGATIVE MG/DL
HCT VFR BLD CALC: 34.5 % (ref 37–47)
HEMOGLOBIN: 11.8 G/DL (ref 12–16)
KETONES, URINE: NEGATIVE MG/DL
LEUKOCYTE ESTERASE, URINE: ABNORMAL
LYMPHOCYTES ABSOLUTE: 1.4 K/UL (ref 1–4.8)
LYMPHOCYTES RELATIVE PERCENT: 33.6 %
MAGNESIUM: 1.7 MG/DL (ref 1.7–2.4)
MCH RBC QN AUTO: 33.2 PG (ref 27–31.3)
MCHC RBC AUTO-ENTMCNC: 34.3 % (ref 33–37)
MCV RBC AUTO: 96.7 FL (ref 82–100)
MONOCYTES ABSOLUTE: 0.5 K/UL (ref 0.2–0.8)
MONOCYTES RELATIVE PERCENT: 12.1 %
NEUTROPHILS ABSOLUTE: 2.1 K/UL (ref 1.4–6.5)
NEUTROPHILS RELATIVE PERCENT: 51 %
NITRITE, URINE: POSITIVE
PDW BLD-RTO: 15.6 % (ref 11.5–14.5)
PH UA: 6.5 (ref 5–9)
PLATELET # BLD: 165 K/UL (ref 130–400)
POTASSIUM SERPL-SCNC: 4.2 MEQ/L (ref 3.4–4.9)
PROTEIN UA: NEGATIVE MG/DL
RBC # BLD: 3.57 M/UL (ref 4.2–5.4)
SODIUM BLD-SCNC: 135 MEQ/L (ref 135–144)
SPECIFIC GRAVITY UA: 1.01 (ref 1–1.03)
TOTAL CK: 26 U/L (ref 0–170)
TOTAL PROTEIN: 5.9 G/DL (ref 6.3–8)
TROPONIN: <0.01 NG/ML (ref 0–0.01)
TSH REFLEX: 3.79 UIU/ML (ref 0.44–3.86)
URINE REFLEX TO CULTURE: ABNORMAL
UROBILINOGEN, URINE: 0.2 E.U./DL
WBC # BLD: 4.1 K/UL (ref 4.8–10.8)
WBC UA: ABNORMAL /HPF (ref 0–5)

## 2022-08-11 PROCEDURE — 83735 ASSAY OF MAGNESIUM: CPT

## 2022-08-11 PROCEDURE — 80053 COMPREHEN METABOLIC PANEL: CPT

## 2022-08-11 PROCEDURE — 81001 URINALYSIS AUTO W/SCOPE: CPT

## 2022-08-11 PROCEDURE — 2580000003 HC RX 258: Performed by: STUDENT IN AN ORGANIZED HEALTH CARE EDUCATION/TRAINING PROGRAM

## 2022-08-11 PROCEDURE — 96361 HYDRATE IV INFUSION ADD-ON: CPT

## 2022-08-11 PROCEDURE — 36415 COLL VENOUS BLD VENIPUNCTURE: CPT

## 2022-08-11 PROCEDURE — 85025 COMPLETE CBC W/AUTO DIFF WBC: CPT

## 2022-08-11 PROCEDURE — 93005 ELECTROCARDIOGRAM TRACING: CPT | Performed by: STUDENT IN AN ORGANIZED HEALTH CARE EDUCATION/TRAINING PROGRAM

## 2022-08-11 PROCEDURE — 96360 HYDRATION IV INFUSION INIT: CPT

## 2022-08-11 PROCEDURE — 82550 ASSAY OF CK (CPK): CPT

## 2022-08-11 PROCEDURE — 6370000000 HC RX 637 (ALT 250 FOR IP): Performed by: STUDENT IN AN ORGANIZED HEALTH CARE EDUCATION/TRAINING PROGRAM

## 2022-08-11 PROCEDURE — 71045 X-RAY EXAM CHEST 1 VIEW: CPT

## 2022-08-11 PROCEDURE — 70450 CT HEAD/BRAIN W/O DYE: CPT

## 2022-08-11 PROCEDURE — 99285 EMERGENCY DEPT VISIT HI MDM: CPT

## 2022-08-11 PROCEDURE — 84443 ASSAY THYROID STIM HORMONE: CPT

## 2022-08-11 PROCEDURE — 6830039000 HC L3 TRAUMA ALERT

## 2022-08-11 PROCEDURE — 84484 ASSAY OF TROPONIN QUANT: CPT

## 2022-08-11 RX ORDER — CEPHALEXIN 500 MG/1
500 CAPSULE ORAL 3 TIMES DAILY
Qty: 20 CAPSULE | Refills: 0 | Status: SHIPPED | OUTPATIENT
Start: 2022-08-11 | End: 2022-08-18

## 2022-08-11 RX ORDER — ACETAMINOPHEN 500 MG
1000 TABLET ORAL EVERY 6 HOURS PRN
Qty: 60 TABLET | Refills: 0 | Status: SHIPPED | OUTPATIENT
Start: 2022-08-11

## 2022-08-11 RX ORDER — ACETAMINOPHEN 500 MG
1000 TABLET ORAL ONCE
Status: COMPLETED | OUTPATIENT
Start: 2022-08-11 | End: 2022-08-11

## 2022-08-11 RX ORDER — 0.9 % SODIUM CHLORIDE 0.9 %
1000 INTRAVENOUS SOLUTION INTRAVENOUS ONCE
Status: COMPLETED | OUTPATIENT
Start: 2022-08-11 | End: 2022-08-11

## 2022-08-11 RX ORDER — CEPHALEXIN 500 MG/1
500 CAPSULE ORAL ONCE
Status: COMPLETED | OUTPATIENT
Start: 2022-08-11 | End: 2022-08-11

## 2022-08-11 RX ADMIN — SODIUM CHLORIDE 1000 ML: 9 INJECTION, SOLUTION INTRAVENOUS at 11:18

## 2022-08-11 RX ADMIN — CEPHALEXIN 500 MG: 500 CAPSULE ORAL at 15:33

## 2022-08-11 RX ADMIN — ACETAMINOPHEN 1000 MG: 500 TABLET ORAL at 11:18

## 2022-08-11 RX ADMIN — SODIUM CHLORIDE 1000 ML: 9 INJECTION, SOLUTION INTRAVENOUS at 13:29

## 2022-08-11 ASSESSMENT — PAIN - FUNCTIONAL ASSESSMENT
PAIN_FUNCTIONAL_ASSESSMENT: 0-10
PAIN_FUNCTIONAL_ASSESSMENT: NONE - DENIES PAIN
PAIN_FUNCTIONAL_ASSESSMENT: NONE - DENIES PAIN

## 2022-08-11 ASSESSMENT — PAIN SCALES - GENERAL
PAINLEVEL_OUTOF10: 3
PAINLEVEL_OUTOF10: 3

## 2022-08-11 ASSESSMENT — PAIN DESCRIPTION - LOCATION: LOCATION: HEAD

## 2022-08-11 NOTE — ED TRIAGE NOTES
Pt states she was walking and got dizzy fell and hit her head on a rolling chair (pt states she then fell on the ground)  Pt states the back of her head is sore (no laceration or bleeding seen)  Pt denies being on blood thinners   Pt denies LOC   Pt is A&Ox 4  Pt is afebrile   Pt states her pain is a 3/10

## 2022-08-11 NOTE — ED PROVIDER NOTES
[address] [facility] ED  eMERGENCY dEPARTMENT eNCOUnter      Pt Name: Faizan Rowell  MRN: [id number]  [place] 1950  Date of evaluation: 8/11/2022  Provider: Baylee Contreras MD      HISTORY OF PRESENT ILLNESS      Chief Complaint   Patient presents with    Fall     Pt got dizzy and fell hit her head on a rolling chair and fell on the floor (no blood thinners and no LOC)       The history is provided by the Patient. Faizan Rowell is a 81 y.o. female with a PMH clinically significant for HTN, HLD, Seizures, MDD presenting to the ED via EMS c/o fall occurring just prior to arrival with preceding lightheadedness, dizziness and fatigue. Patient states she fell backwards and very mildly hit her head on a rolling chair. States only mild pain only at the site of where she hit her head at the back of the head. Denies any associated neck pain, photophobia, numbness, weakness, tingling. Patient states she tried to sit down before hand, but was unable to. Thinks she might be a little bit dehydrated. Has had some mild dysuria and urinary frequency. States she has otherwise been feeling well. Denies any other injuries from the fall. States she has been taking all medications as indicated. Denies any known history of heart disease, arrhythmias, DVT/PE. Is not on any anticoagulation. Characterizes pain as mild, aching, constant. Improved with nothing, nothing tried pta. No longer feeling dizzy or lightheaded. Per Chart Review: No recent evaluations in the ED. Most recently admitted in 2017 for breakthrough seizures, sinus bradycardia, hyponatremia and UTI. Also with diagnosis of bipolar at that time. Only noted to be on Trileptal at that time. REVIEW OF SYSTEMS       Review of Systems   Constitutional:  Positive for fatigue. Negative for activity change, chills, diaphoresis and fever. HENT:  Negative for facial swelling, nosebleeds, rhinorrhea and sore throat.     Eyes:  Negative for pain and visual disturbance. Respiratory:  Negative for cough and shortness of breath. Cardiovascular:  Negative for chest pain and palpitations. Gastrointestinal:  Negative for abdominal pain, diarrhea, nausea and vomiting. Genitourinary:  Positive for dysuria and frequency. Negative for difficulty urinating and hematuria. Musculoskeletal:  Negative for back pain and neck pain. Skin:  Negative for rash and wound. Neurological:  Positive for dizziness, light-headedness and headaches. Negative for weakness and numbness. PAST MEDICAL HISTORY     Past Medical History:   Diagnosis Date    Depression     Hyperlipidemia     Hypertension     Seizures ([facility] [address]        SURGICAL HISTORY       Past Surgical History:   Procedure Laterality Date    COLON SURGERY         FAMILY HISTORY     No family history on file.     SOCIAL HISTORY       Social History     Socioeconomic History    Marital status:      Spouse name: None    Number of children: None    Years of education: None    Highest education level: None   Tobacco Use    Smoking status: Light Smoker    Smokeless tobacco: Never   Substance and Sexual Activity    Alcohol use: No    Drug use: No       CURRENT MEDICATIONS       Discharge Medication List as of 8/11/2022  3:28 PM        CONTINUE these medications which have NOT CHANGED    Details   !! OXcarbazepine (TRILEPTAL) 600 MG tablet Take 1 tablet by mouth 2 times daily, Disp-60 tablet, R-1Print      !! OXcarbazepine (TRILEPTAL) 600 MG tablet Take 1 tablet by mouth 2 times daily, Disp-60 tablet, R-1Print      aspirin 81 MG chewable tablet Take 81 mg by mouth dailyHistorical Med      DULoxetine (CYMBALTA) 60 MG extended release capsule Take 60 mg by mouth dailyHistorical Med      atorvastatin (LIPITOR) 10 MG tablet Take 10 mg by mouth dailyHistorical Med      metoprolol succinate (TOPROL XL) 50 MG extended release tablet Take 50 mg by mouth dailyHistorical Med      Cholecalciferol (VITAMIN D3) 5000 units TABS Take 5,000 Units by mouth dailyHistorical Med       !! - Potential duplicate medications found. Please discuss with provider. ALLERGIES     Percocet [oxycodone-acetaminophen]      PHYSICAL EXAM       ED Triage Vitals [08/11/22 1032]   BP Temp Temp Source Pulse Resp SpO2 Height Weight   103/89 98.7 °F (37.1 °C) Oral -- 18 95 % 5' 6\" (1.676 m) 195 lb (88.5 kg)       Physical Exam  Vitals and nursing note reviewed. Exam conducted with a chaperone present. Constitutional:       General: She is not in acute distress. Appearance: She is obese. She is not ill-appearing, toxic-appearing or diaphoretic. HENT:      Head: Normocephalic and atraumatic. Right Ear: Tympanic membrane normal.      Left Ear: Tympanic membrane normal.      Nose:      Right Nostril: No septal hematoma. Left Nostril: No septal hematoma. Mouth/Throat:      Mouth: Mucous membranes are dry. No injury. Pharynx: Oropharynx is clear. No oropharyngeal exudate or posterior oropharyngeal erythema. Eyes:      Extraocular Movements: Extraocular movements intact. Pupils: Pupils are equal, round, and reactive to light. Neck:      Trachea: Trachea normal.   Cardiovascular:      Rate and Rhythm: Normal rate and regular rhythm. Pulses: Normal pulses. Pulmonary:      Effort: No respiratory distress. Breath sounds: Normal breath sounds. Chest:      Chest wall: No deformity, tenderness or crepitus. Abdominal:      General: There is no distension. Palpations: Abdomen is soft. Tenderness: There is no abdominal tenderness. There is no guarding or rebound. Musculoskeletal:         General: No deformity. Cervical back: No tenderness. No spinous process tenderness. Right lower leg: No edema. Left lower leg: No edema. Comments: Pelvis Stable   Skin:     General: Skin is warm and dry. Capillary Refill: Capillary refill takes less than 2 seconds.    Neurological:      General: No focal deficit present. Mental Status: She is alert and oriented to person, place, and time. Sensory: No sensory deficit. Motor: No weakness. Psychiatric:         Mood and Affect: Mood normal.         Behavior: Behavior normal.       MDM:   Chart Reviewed: [facility] and additional information as noted in HPI obtained from chart review    Vitals:    Vitals:    08/11/22 1445 08/11/22 1500 08/11/22 1515 08/11/22 1530   BP:  122/62  104/75   Pulse: 70 72  70   Resp: 17 17  15   Temp:       TempSrc:       SpO2: 97% 99% 100% 98%   Weight:       Height:           PROCEDURES:  Unless otherwise noted below, none  Procedures    LABS:  Labs Reviewed   COMPREHENSIVE METABOLIC PANEL - Abnormal; Notable for the following components:       Result Value    Anion Gap 7 (*)     Creatinine 0.96 (*)     GFR Non- 57.4 (*)     Total Protein 5.9 (*)     All other components within normal limits   CBC WITH AUTO DIFFERENTIAL - Abnormal; Notable for the following components:    WBC 4.1 (*)     RBC 3.57 (*)     Hemoglobin 11.8 (*)     Hematocrit 34.5 (*)     MCH 33.2 (*)     RDW 15.6 (*)     All other components within normal limits   URINALYSIS WITH REFLEX TO CULTURE - Abnormal; Notable for the following components:    Nitrite, Urine POSITIVE (*)     Leukocyte Esterase, Urine LARGE (*)     All other components within normal limits   MICROSCOPIC URINALYSIS - Abnormal; Notable for the following components:    Bacteria, UA MANY (*)     All other components within normal limits   MAGNESIUM   TROPONIN   CK   TSH WITH REFLEX       XR CHEST PORTABLE   Final Result   NO ACUTE ACTIVE CARDIOPULMONARY PROCESS      CT HEAD WO CONTRAST   Final Result      NO ACUTE INTRACRANIAL PROCESS IDENTIFIED. ED Course as of 08/18/22 0939   u Aug 11, 2022   1211 EKG 12 Lead  EKG showing sinus rhythm with first-degree AVB. Normal axis, otherwise normal intervals. Nonspecific ST-T wave abnormalities.  [NA]   [phone] Creatinine(!): 0.96  Mild AUBREY on CKD [NA]   1452 Hemoglobin Quant(!): 11.8  Mild anemia c/w baseline [NA]   1452 WBC(!): 4.1  Leukopenia at baseline. [NA]   1509 Nitrite, Urine(!): POSITIVE [NA]   1509 Leukocyte Esterase, Urine(!): LARGE [NA]      ED Course User Index  [NA] Obi Rene MD       81 y.o. female with a PMH clinically significant for HTN, HLD, Seizures, MDD presenting to the ED via EMS c/o fall occurring just prior to arrival with preceding lightheadedness, dizziness and fatigue. Upon initial evaluation, Pt mildly fatigued appearing, but otherwise Afebrile, HDS and in NAD. PE as noted above. Labs, , EKG,, and Imaging as noted above. Given findings, clinical presentation most likely consistent w/ fall without evidence of significant acute traumatic injuries in the setting of likely UTI and mild dehydration. Mild AUBREY on CKD. Patient otherwise largely well-appearing in the ED. No gross evidence of arrhythmias. Low suspicion for ACS or DVT/PE. Low suspicion for cardiogenic syncope. Symptoms improved following meds and fluid resuscitation in the ED. Tolerated p.o. and ambulatory challenge is without difficulty. Stable for further evaluation and management as an outpatient. Pt was administered   Medications   0.9 % sodium chloride bolus (0 mLs IntraVENous Stopped 8/11/22 1402)   acetaminophen (TYLENOL) tablet 1,000 mg (1,000 mg Oral Given 8/11/22 1118)   0.9 % sodium chloride bolus (0 mLs IntraVENous Stopped 8/11/22 1432)   cephALEXin (KEFLEX) capsule 500 mg (500 mg Oral Given 8/11/22 1533)       Plan: Discharge home in good condition with meds as noted below and instructions to follow up with PCP . Pt stable and appropriate for further evaluation and management as an outpatient. and Patient understanding and amenable to the POC. CRITICAL CARE TIME   Total CriticalCare time was 0 minutes, excluding separately reportable procedures.   There was a high probability of clinically significant/life threatening deterioration in the patient's condition which required my urgent intervention. FINAL IMPRESSION      1. Fall, initial encounter    2. Lightheadedness    3. Dehydration    4. Urinary tract infection without hematuria, site unspecified          DISPOSITION/PLAN   DISPOSITION Decision To Discharge 08/11/2022 03:38:02 PM      Discharge Medication List as of 8/11/2022  3:28 PM        START taking these medications    Details   cephALEXin (KEFLEX) 500 MG capsule Take 1 capsule by mouth in the morning and 1 capsule at noon and 1 capsule before bedtime. Do all this for 7 days. , Disp-20 capsule, R-0Normal      acetaminophen (TYLENOL) 500 MG tablet Take 2 tablets by mouth every 6 hours as needed for Pain or Fever, Disp-60 tablet, R-0Normal              MD Viraj Fenton MD  08/18/22 2910

## 2022-08-12 LAB
EKG ATRIAL RATE: 67 BPM
EKG P AXIS: 77 DEGREES
EKG P-R INTERVAL: 218 MS
EKG Q-T INTERVAL: 402 MS
EKG QRS DURATION: 94 MS
EKG QTC CALCULATION (BAZETT): 424 MS
EKG R AXIS: -17 DEGREES
EKG T AXIS: 25 DEGREES
EKG VENTRICULAR RATE: 67 BPM

## 2022-08-12 PROCEDURE — 93010 ELECTROCARDIOGRAM REPORT: CPT | Performed by: INTERNAL MEDICINE

## 2022-08-18 ASSESSMENT — ENCOUNTER SYMPTOMS
SHORTNESS OF BREATH: 0
EYE PAIN: 0
SORE THROAT: 0
RHINORRHEA: 0
ABDOMINAL PAIN: 0
VOMITING: 0
NAUSEA: 0
FACIAL SWELLING: 0
COUGH: 0
BACK PAIN: 0
DIARRHEA: 0

## 2023-03-25 LAB
ALANINE AMINOTRANSFERASE (SGPT) (U/L) IN SER/PLAS: 6 U/L (ref 7–45)
ALBUMIN (G/DL) IN SER/PLAS: 3.7 G/DL (ref 3.4–5)
ALKALINE PHOSPHATASE (U/L) IN SER/PLAS: 44 U/L (ref 33–136)
ANION GAP IN SER/PLAS: 10 MMOL/L (ref 10–20)
ASPARTATE AMINOTRANSFERASE (SGOT) (U/L) IN SER/PLAS: 11 U/L (ref 9–39)
BASOPHILS (10*3/UL) IN BLOOD BY AUTOMATED COUNT: 0.04 X10E9/L (ref 0–0.1)
BASOPHILS/100 LEUKOCYTES IN BLOOD BY AUTOMATED COUNT: 0.4 % (ref 0–2)
BILIRUBIN DIRECT (MG/DL) IN SER/PLAS: 0 MG/DL (ref 0–0.3)
BILIRUBIN TOTAL (MG/DL) IN SER/PLAS: 0.4 MG/DL (ref 0–1.2)
CALCIDIOL (25 OH VITAMIN D3) (NG/ML) IN SER/PLAS: 38 NG/ML
CALCIUM (MG/DL) IN SER/PLAS: 9.1 MG/DL (ref 8.6–10.3)
CARBON DIOXIDE, TOTAL (MMOL/L) IN SER/PLAS: 27 MMOL/L (ref 21–32)
CHLORIDE (MMOL/L) IN SER/PLAS: 103 MMOL/L (ref 98–107)
CHOLESTEROL (MG/DL) IN SER/PLAS: 134 MG/DL (ref 0–199)
CHOLESTEROL IN HDL (MG/DL) IN SER/PLAS: 36.5 MG/DL
CHOLESTEROL/HDL RATIO: 3.7
CREATININE (MG/DL) IN SER/PLAS: 1 MG/DL (ref 0.5–1.05)
EOSINOPHILS (10*3/UL) IN BLOOD BY AUTOMATED COUNT: 0.16 X10E9/L (ref 0–0.4)
EOSINOPHILS/100 LEUKOCYTES IN BLOOD BY AUTOMATED COUNT: 1.8 % (ref 0–6)
ERYTHROCYTE DISTRIBUTION WIDTH (RATIO) BY AUTOMATED COUNT: 13.8 % (ref 11.5–14.5)
ERYTHROCYTE MEAN CORPUSCULAR HEMOGLOBIN CONCENTRATION (G/DL) BY AUTOMATED: 33.6 G/DL (ref 32–36)
ERYTHROCYTE MEAN CORPUSCULAR VOLUME (FL) BY AUTOMATED COUNT: 98 FL (ref 80–100)
ERYTHROCYTES (10*6/UL) IN BLOOD BY AUTOMATED COUNT: 3.65 X10E12/L (ref 4–5.2)
GFR FEMALE: 60 ML/MIN/1.73M2
GLUCOSE (MG/DL) IN SER/PLAS: 85 MG/DL (ref 74–99)
HEMATOCRIT (%) IN BLOOD BY AUTOMATED COUNT: 35.7 % (ref 36–46)
HEMOGLOBIN (G/DL) IN BLOOD: 12 G/DL (ref 12–16)
IMMATURE GRANULOCYTES/100 LEUKOCYTES IN BLOOD BY AUTOMATED COUNT: 0.4 % (ref 0–0.9)
LDL: 62 MG/DL (ref 0–99)
LEUKOCYTES (10*3/UL) IN BLOOD BY AUTOMATED COUNT: 9 X10E9/L (ref 4.4–11.3)
LYMPHOCYTES (10*3/UL) IN BLOOD BY AUTOMATED COUNT: 1.25 X10E9/L (ref 0.8–3)
LYMPHOCYTES/100 LEUKOCYTES IN BLOOD BY AUTOMATED COUNT: 13.9 % (ref 13–44)
MONOCYTES (10*3/UL) IN BLOOD BY AUTOMATED COUNT: 0.8 X10E9/L (ref 0.05–0.8)
MONOCYTES/100 LEUKOCYTES IN BLOOD BY AUTOMATED COUNT: 8.9 % (ref 2–10)
NEUTROPHILS (10*3/UL) IN BLOOD BY AUTOMATED COUNT: 6.69 X10E9/L (ref 1.6–5.5)
NEUTROPHILS/100 LEUKOCYTES IN BLOOD BY AUTOMATED COUNT: 74.6 % (ref 40–80)
PLATELETS (10*3/UL) IN BLOOD AUTOMATED COUNT: 164 X10E9/L (ref 150–450)
POTASSIUM (MMOL/L) IN SER/PLAS: 4.2 MMOL/L (ref 3.5–5.3)
PROTEIN TOTAL: 6 G/DL (ref 6.4–8.2)
SODIUM (MMOL/L) IN SER/PLAS: 136 MMOL/L (ref 136–145)
THYROTROPIN (MIU/L) IN SER/PLAS BY DETECTION LIMIT <= 0.05 MIU/L: 3.95 MIU/L (ref 0.44–3.98)
TRIGLYCERIDE (MG/DL) IN SER/PLAS: 178 MG/DL (ref 0–149)
UREA NITROGEN (MG/DL) IN SER/PLAS: 15 MG/DL (ref 6–23)
VALPROIC ACID (UG/ML) IN SER/PLAS: 64 UG/ML (ref 50–100)
VLDL: 36 MG/DL (ref 0–40)

## 2023-03-27 ENCOUNTER — TELEPHONE (OUTPATIENT)
Dept: PRIMARY CARE | Facility: CLINIC | Age: 73
End: 2023-03-27
Payer: COMMERCIAL

## 2023-03-27 DIAGNOSIS — R05.9 COUGH, UNSPECIFIED TYPE: Primary | ICD-10-CM

## 2023-03-27 RX ORDER — METHYLPREDNISOLONE 4 MG/1
TABLET ORAL
Qty: 21 TABLET | Refills: 0 | Status: SHIPPED | OUTPATIENT
Start: 2023-03-27 | End: 2023-04-10 | Stop reason: WASHOUT

## 2023-03-27 RX ORDER — DOXYCYCLINE 100 MG/1
100 CAPSULE ORAL 2 TIMES DAILY
Qty: 20 CAPSULE | Refills: 0 | Status: SHIPPED | OUTPATIENT
Start: 2023-03-27 | End: 2023-04-10 | Stop reason: WASHOUT

## 2023-03-27 NOTE — TELEPHONE ENCOUNTER
Patient says she would like the steroid and abx. She wants it sent to [facility] on [address], pt notified

## 2023-03-27 NOTE — TELEPHONE ENCOUNTER
Pt says she tested positive for Covid on Saturday and is requesting medication, states her symptoms are watery eyes, runny nose, sore throat, cough and headaches, states she gets some SOB but only when she coughs. States she has been taking tylenol and Nyquil but she says is not getting better.

## 2023-04-06 PROBLEM — R32 URINARY INCONTINENCE IN FEMALE: Status: ACTIVE | Noted: 2023-04-06

## 2023-04-06 PROBLEM — R10.11 ACUTE ABDOMINAL PAIN IN RIGHT UPPER QUADRANT: Status: ACTIVE | Noted: 2023-04-06

## 2023-04-06 PROBLEM — N28.9 KIDNEY LESION, NATIVE, BILATERAL: Status: ACTIVE | Noted: 2023-04-06

## 2023-04-06 PROBLEM — H91.90 HEARING LOSS: Status: ACTIVE | Noted: 2023-04-06

## 2023-04-06 PROBLEM — H90.8 MIXED HEARING LOSS: Status: ACTIVE | Noted: 2023-04-06

## 2023-04-06 PROBLEM — G89.29 CHRONIC LEFT SHOULDER PAIN: Status: ACTIVE | Noted: 2023-04-06

## 2023-04-06 PROBLEM — T63.441A ALLERGIC REACTION TO BEE STING: Status: ACTIVE | Noted: 2023-04-06

## 2023-04-06 PROBLEM — E28.39 ESTROGEN DEFICIENCY: Status: ACTIVE | Noted: 2023-04-06

## 2023-04-06 PROBLEM — H69.90 EUSTACHIAN TUBE DYSFUNCTION: Status: ACTIVE | Noted: 2023-04-06

## 2023-04-06 PROBLEM — R35.0 URINARY FREQUENCY: Status: ACTIVE | Noted: 2023-04-06

## 2023-04-06 PROBLEM — E55.9 VITAMIN D DEFICIENCY: Status: ACTIVE | Noted: 2023-04-06

## 2023-04-06 PROBLEM — F17.210 CIGARETTE NICOTINE DEPENDENCE WITHOUT COMPLICATION: Status: ACTIVE | Noted: 2023-04-06

## 2023-04-06 PROBLEM — F31.9 BIPOLAR DEPRESSION (MULTI): Status: ACTIVE | Noted: 2023-04-06

## 2023-04-06 PROBLEM — E78.5 HYPERLIPIDEMIA, UNSPECIFIED: Status: ACTIVE | Noted: 2023-04-06

## 2023-04-06 PROBLEM — N93.9 VAGINAL BLEEDING: Status: ACTIVE | Noted: 2023-04-06

## 2023-04-06 PROBLEM — M54.6 ACUTE RIGHT-SIDED THORACIC BACK PAIN: Status: ACTIVE | Noted: 2023-04-06

## 2023-04-06 PROBLEM — K21.9 GERD (GASTROESOPHAGEAL REFLUX DISEASE): Status: ACTIVE | Noted: 2023-04-06

## 2023-04-06 PROBLEM — F17.210 CIGARETTE SMOKER: Status: ACTIVE | Noted: 2023-04-06

## 2023-04-06 PROBLEM — J34.2 NASAL SEPTAL DEVIATION: Status: ACTIVE | Noted: 2023-04-06

## 2023-04-06 PROBLEM — E04.1 THYROID NODULE: Status: ACTIVE | Noted: 2023-04-06

## 2023-04-06 PROBLEM — M51.369 DEGENERATION OF INTERVERTEBRAL DISC OF LUMBAR REGION: Status: ACTIVE | Noted: 2023-04-06

## 2023-04-06 PROBLEM — J44.9 COPD (CHRONIC OBSTRUCTIVE PULMONARY DISEASE) (MULTI): Status: ACTIVE | Noted: 2023-04-06

## 2023-04-06 PROBLEM — N39.0 ACUTE UTI (URINARY TRACT INFECTION): Status: ACTIVE | Noted: 2023-04-06

## 2023-04-06 PROBLEM — E04.2 MULTINODULAR GOITER: Status: ACTIVE | Noted: 2023-04-06

## 2023-04-06 PROBLEM — R31.0 HEMATURIA, GROSS: Status: ACTIVE | Noted: 2023-04-06

## 2023-04-06 PROBLEM — G47.00 INSOMNIA: Status: ACTIVE | Noted: 2023-04-06

## 2023-04-06 PROBLEM — G89.18 ACUTE POSTOPERATIVE PAIN: Status: ACTIVE | Noted: 2023-04-06

## 2023-04-06 PROBLEM — G25.81 RESTLESS LEG SYNDROME: Status: ACTIVE | Noted: 2023-04-06

## 2023-04-06 PROBLEM — R82.90 ABNORMAL URINE FINDINGS: Status: ACTIVE | Noted: 2023-04-06

## 2023-04-06 PROBLEM — U07.1 COVID-19 VIRUS INFECTION: Status: ACTIVE | Noted: 2023-04-06

## 2023-04-06 PROBLEM — R56.9 SEIZURES (MULTI): Status: ACTIVE | Noted: 2023-04-06

## 2023-04-06 PROBLEM — M62.830 LUMBAR PARASPINAL MUSCLE SPASM: Status: ACTIVE | Noted: 2023-04-06

## 2023-04-06 PROBLEM — S39.012A LUMBAR STRAIN: Status: ACTIVE | Noted: 2023-04-06

## 2023-04-06 PROBLEM — D12.6 TUBULAR ADENOMA OF COLON: Status: ACTIVE | Noted: 2023-04-06

## 2023-04-06 PROBLEM — M54.50 LOWER BACK PAIN: Status: ACTIVE | Noted: 2023-04-06

## 2023-04-06 PROBLEM — I10 HTN (HYPERTENSION): Status: ACTIVE | Noted: 2023-04-06

## 2023-04-06 PROBLEM — M25.512 CHRONIC LEFT SHOULDER PAIN: Status: ACTIVE | Noted: 2023-04-06

## 2023-04-06 PROBLEM — M51.36 DEGENERATION OF INTERVERTEBRAL DISC OF LUMBAR REGION: Status: ACTIVE | Noted: 2023-04-06

## 2023-04-06 PROBLEM — J34.3 NASAL TURBINATE HYPERTROPHY: Status: ACTIVE | Noted: 2023-04-06

## 2023-04-06 RX ORDER — CLONIDINE HYDROCHLORIDE 0.1 MG/1
1 TABLET ORAL NIGHTLY
COMMUNITY
Start: 2020-11-11 | End: 2023-04-10 | Stop reason: WASHOUT

## 2023-04-06 RX ORDER — CYCLOBENZAPRINE HCL 10 MG
10 TABLET ORAL NIGHTLY PRN
COMMUNITY
End: 2023-04-10 | Stop reason: SDUPTHER

## 2023-04-06 RX ORDER — DULOXETIN HYDROCHLORIDE 30 MG/1
CAPSULE, DELAYED RELEASE ORAL
COMMUNITY
Start: 2020-03-02 | End: 2023-07-17 | Stop reason: ALTCHOICE

## 2023-04-06 RX ORDER — TRAMADOL HYDROCHLORIDE 50 MG/1
1 TABLET ORAL EVERY 6 HOURS PRN
COMMUNITY
Start: 2022-03-03 | End: 2023-07-17 | Stop reason: ALTCHOICE

## 2023-04-06 RX ORDER — TIOTROPIUM BROMIDE INHALATION SPRAY 3.12 UG/1
2 SPRAY, METERED RESPIRATORY (INHALATION) DAILY
COMMUNITY
End: 2023-04-10 | Stop reason: SDUPTHER

## 2023-04-06 RX ORDER — DULOXETIN HYDROCHLORIDE 60 MG/1
60 CAPSULE, DELAYED RELEASE ORAL 2 TIMES DAILY
COMMUNITY
End: 2024-01-31 | Stop reason: SDUPTHER

## 2023-04-06 RX ORDER — NITROFURANTOIN 25; 75 MG/1; MG/1
1 CAPSULE ORAL 2 TIMES DAILY
COMMUNITY
Start: 2023-01-16 | End: 2023-04-10 | Stop reason: WASHOUT

## 2023-04-06 RX ORDER — NYSTATIN 100000 U/G
CREAM TOPICAL
COMMUNITY
End: 2023-04-10 | Stop reason: WASHOUT

## 2023-04-06 RX ORDER — ATORVASTATIN CALCIUM 10 MG/1
10 TABLET, FILM COATED ORAL NIGHTLY
COMMUNITY
End: 2023-04-10 | Stop reason: SDUPTHER

## 2023-04-06 RX ORDER — ACETAMINOPHEN 500 MG
TABLET ORAL
COMMUNITY
Start: 2022-08-11 | End: 2023-04-10 | Stop reason: WASHOUT

## 2023-04-06 RX ORDER — NAPROXEN 500 MG/1
TABLET ORAL
COMMUNITY
End: 2023-04-10 | Stop reason: SDUPTHER

## 2023-04-06 RX ORDER — DIVALPROEX SODIUM 500 MG/1
TABLET, FILM COATED, EXTENDED RELEASE ORAL
COMMUNITY
Start: 2020-03-02

## 2023-04-06 RX ORDER — HYDROGEN PEROXIDE 3 %
20 SOLUTION, NON-ORAL MISCELLANEOUS DAILY
COMMUNITY
End: 2023-07-17 | Stop reason: ALTCHOICE

## 2023-04-06 RX ORDER — ACETAMINOPHEN 500 MG
TABLET ORAL
COMMUNITY

## 2023-04-06 RX ORDER — METOPROLOL SUCCINATE 50 MG/1
50 TABLET, EXTENDED RELEASE ORAL DAILY
COMMUNITY
End: 2023-04-10 | Stop reason: SDUPTHER

## 2023-04-06 RX ORDER — AMITRIPTYLINE HYDROCHLORIDE 75 MG/1
TABLET ORAL
COMMUNITY
End: 2023-04-10 | Stop reason: WASHOUT

## 2023-04-06 RX ORDER — DIVALPROEX SODIUM 500 MG/1
TABLET, DELAYED RELEASE ORAL
COMMUNITY
End: 2023-04-10 | Stop reason: WASHOUT

## 2023-04-06 RX ORDER — CEPHALEXIN 500 MG/1
CAPSULE ORAL
COMMUNITY
Start: 2022-08-11 | End: 2023-04-10 | Stop reason: WASHOUT

## 2023-04-10 ENCOUNTER — OFFICE VISIT (OUTPATIENT)
Dept: PRIMARY CARE | Facility: CLINIC | Age: 73
End: 2023-04-10
Payer: COMMERCIAL

## 2023-04-10 VITALS
BODY MASS INDEX: 31.45 KG/M2 | DIASTOLIC BLOOD PRESSURE: 70 MMHG | TEMPERATURE: 96.5 F | HEIGHT: 67 IN | HEART RATE: 70 BPM | WEIGHT: 200.4 LBS | SYSTOLIC BLOOD PRESSURE: 110 MMHG | OXYGEN SATURATION: 96 %

## 2023-04-10 DIAGNOSIS — G25.81 RESTLESS LEG SYNDROME: ICD-10-CM

## 2023-04-10 DIAGNOSIS — F17.210 CIGARETTE SMOKER: ICD-10-CM

## 2023-04-10 DIAGNOSIS — E78.2 MIXED HYPERLIPIDEMIA: ICD-10-CM

## 2023-04-10 DIAGNOSIS — I10 PRIMARY HYPERTENSION: Primary | ICD-10-CM

## 2023-04-10 DIAGNOSIS — M54.50 CHRONIC BILATERAL LOW BACK PAIN, UNSPECIFIED WHETHER SCIATICA PRESENT: ICD-10-CM

## 2023-04-10 DIAGNOSIS — J44.9 CHRONIC OBSTRUCTIVE PULMONARY DISEASE, UNSPECIFIED COPD TYPE (MULTI): ICD-10-CM

## 2023-04-10 DIAGNOSIS — G89.29 CHRONIC BILATERAL LOW BACK PAIN, UNSPECIFIED WHETHER SCIATICA PRESENT: ICD-10-CM

## 2023-04-10 PROCEDURE — 3078F DIAST BP <80 MM HG: CPT | Performed by: FAMILY MEDICINE

## 2023-04-10 PROCEDURE — 3074F SYST BP LT 130 MM HG: CPT | Performed by: FAMILY MEDICINE

## 2023-04-10 PROCEDURE — 99214 OFFICE O/P EST MOD 30 MIN: CPT | Performed by: FAMILY MEDICINE

## 2023-04-10 PROCEDURE — 1159F MED LIST DOCD IN RCRD: CPT | Performed by: FAMILY MEDICINE

## 2023-04-10 PROCEDURE — 4004F PT TOBACCO SCREEN RCVD TLK: CPT | Performed by: FAMILY MEDICINE

## 2023-04-10 PROCEDURE — 1160F RVW MEDS BY RX/DR IN RCRD: CPT | Performed by: FAMILY MEDICINE

## 2023-04-10 RX ORDER — CYCLOBENZAPRINE HCL 10 MG
10 TABLET ORAL NIGHTLY PRN
Qty: 90 TABLET | Refills: 0 | Status: SHIPPED | OUTPATIENT
Start: 2023-04-10 | End: 2023-07-10 | Stop reason: SDUPTHER

## 2023-04-10 RX ORDER — NAPROXEN SODIUM 220 MG/1
81 TABLET, FILM COATED ORAL DAILY
COMMUNITY
End: 2023-04-10 | Stop reason: WASHOUT

## 2023-04-10 RX ORDER — METOPROLOL SUCCINATE 50 MG/1
50 TABLET, EXTENDED RELEASE ORAL DAILY
Qty: 90 TABLET | Refills: 0 | Status: SHIPPED | OUTPATIENT
Start: 2023-04-10 | End: 2023-07-10 | Stop reason: SDUPTHER

## 2023-04-10 RX ORDER — NAPROXEN 500 MG/1
500 TABLET ORAL
Qty: 180 TABLET | Refills: 0 | Status: SHIPPED | OUTPATIENT
Start: 2023-04-10 | End: 2023-07-10 | Stop reason: SDUPTHER

## 2023-04-10 RX ORDER — ATORVASTATIN CALCIUM 10 MG/1
10 TABLET, FILM COATED ORAL NIGHTLY
Qty: 90 TABLET | Refills: 0 | Status: SHIPPED | OUTPATIENT
Start: 2023-04-10 | End: 2023-07-10 | Stop reason: SDUPTHER

## 2023-04-10 RX ORDER — TIOTROPIUM BROMIDE INHALATION SPRAY 3.12 UG/1
2 SPRAY, METERED RESPIRATORY (INHALATION) DAILY
Qty: 3 EACH | Refills: 0 | Status: SHIPPED | OUTPATIENT
Start: 2023-04-10 | End: 2023-07-10 | Stop reason: SDUPTHER

## 2023-04-10 ASSESSMENT — PATIENT HEALTH QUESTIONNAIRE - PHQ9
1. LITTLE INTEREST OR PLEASURE IN DOING THINGS: NOT AT ALL
SUM OF ALL RESPONSES TO PHQ9 QUESTIONS 1 AND 2: 0
2. FEELING DOWN, DEPRESSED OR HOPELESS: NOT AT ALL

## 2023-04-10 NOTE — PATIENT INSTRUCTIONS
Follow up in 3 months.  It was a pleasure to see you today. Thank you for choosing us for your health care needs.    If you have lab or other testing completed and have not been informed of results within one week, please call the office for your results.    If you haven't done so, consider signing up for [facility], the [facility] personal health record. Ask the staff how you can get started.  Follow up in 3 months with labs prior

## 2023-04-10 NOTE — PROGRESS NOTES
"Subjective   Patient ID: Jeane Rivas is a 73 y.o. female who presents for 3 month follow up.      HPI   Patient usually sees my PA, Yanni Womack as her PCP.    Patient has hypertension.  Patient does not monitor BP at home.    Denies CP, SOB, dizziness, and LE edema.    Patient is compliant with antihypertensive therapy and denies any noted side effects.    Patient has hyperlipidemia.  Patient tries to limit the amount of fatty foods and high cholesterol foods that are consumed.  Patient is compliant with current medication and denies any noted side effects.    She has restless leg syndrome.  She reports that symptoms have been stable with the current treatment plan.    Patient has known COPD.  She denies any exacerbations since her last visit.  She unfortunately continues to smoke.    She has chronic back pain.  Symptoms are stable but persistent.       Review of Systems: Constitutional: Patient denies any fever, chills, loss of appetite, or unexplained weight loss.  Cardiovascular: Patient denies any chest pain, shortness of breath with exertion, tachycardia, palpitations, orthopnea, or paroxysmal nocturnal dyspnea.  Respiratory: Patient denies any cough, shortness breath, or wheezing.   Genitourinary: Patient denies any dysuria, hematuria.      SEE HISTORY OF PRESENT ILLNESS ALSO    Objective   /73   Pulse 70   Temp 35.8 °C (96.5 °F)   Ht 1.689 m (5' 6.5\")   Wt 90.9 kg (200 lb 6.4 oz)   SpO2 96%   BMI 31.86 kg/m²     Physical Exam: General Appearance: Alert and cooperative, in no acute distress, well-developed/well-nourished obese female.      Neck: Supple and without adenopathy or rigidity. There is no JVD at 90° and no carotid bruits are noted. There is no thyromegaly, thyroid tenderness, or palpable thyroid nodules.  Heart: Regular rate and rhythm without murmur or ectopy.  Lungs: Clear to auscultation bilaterally with good air exchange.  Skin: Good turgor, moist, warm and without rashes or " lesions.  Neurological exam: Alert and oriented Ã--3, no tremor, normal gait.  Extremities: No clubbing, cyanosis, or edema.  Abdomen: Soft, nontender/nondistended. No masses, guarding, rebound, or rigidity. No hepatosplenomegaly, abdominal bruits, or CVA tenderness. Bowel sounds are normal. There is no widening of the aortic pulsation.    Assessment/Plan     HTN:  Blood pressure appears adequately controlled and we will continue with the current antihypertensive therapy.    Hyperlipidemia: Patient will continue with the current medication.  Dietary changes, exercise, and maintenance of healthy weight were discussed at length.    Restless leg syndrome:  Stable based on symptoms.  We will continue with the current medication.    COPD: Stable based on symptoms.  She will continue on the current dose of Spiriva.  Smoking cessation was encouraged.    Cigarette smoker:  She unfortunately has continued to smoke.  Patient understands that continued smoking will increase the risks of lung disease, vascular disease, and multiple malignancies.  Pt. has been encouraged to work on smoking cessation and available smoking cessation aids were discussed.    Chronic lower back pain:  Stable based on symptoms.  She will continue the current medication.  Naproxen and cyclobenzaprine will refill 4/10/23      Scribe Attestation  By signing my name below, IIsael , Scribbarrington   attest that this documentation has been prepared under the direction and in the presence of Dr. Huerta.

## 2023-04-10 NOTE — PROGRESS NOTES
Subjective   Patient ID: Jeane Rivas is a 73 y.o. female who presents for urinary complaints.    HPI   Patient sees my PA, Yanni Womack as her PCP.        Pt had LOC on 8/11/2022 due to dehydration and went to ER.  Pt was Dx with UTI around 8/11/2022 and was prescribed antibiotics.  She completed antibiotics on 8/18/2022.     She states she woke up at 2 am last night and noticed blood on toilet paper after urinating.  Sxs include mild burning and frequent urination.  Pt has had a hysterectomy.  Denies any abdominal or back pain.      Review of Systems    Objective   There were no vitals taken for this visit.    Physical Exam    Assessment/Plan

## 2023-05-01 PROBLEM — N39.0 ACUTE UTI (URINARY TRACT INFECTION): Status: RESOLVED | Noted: 2023-04-06 | Resolved: 2023-05-01

## 2023-07-05 PROBLEM — H25.813 COMBINED FORMS OF AGE-RELATED CATARACT OF BOTH EYES: Status: ACTIVE | Noted: 2020-11-04

## 2023-07-05 PROBLEM — F34.1 CHRONIC DEPRESSIVE PERSONALITY DISORDER: Status: ACTIVE | Noted: 2023-07-05

## 2023-07-05 PROBLEM — K57.92 DIVERTICULITIS: Status: ACTIVE | Noted: 2020-11-04

## 2023-07-05 RX ORDER — VENLAFAXINE HYDROCHLORIDE 37.5 MG/1
37.5 CAPSULE, EXTENDED RELEASE ORAL
COMMUNITY
Start: 2009-09-08 | End: 2023-07-17 | Stop reason: ALTCHOICE

## 2023-07-05 RX ORDER — CLONIDINE HYDROCHLORIDE 0.1 MG/1
0.1 TABLET ORAL
COMMUNITY
Start: 2020-11-11 | End: 2023-07-17 | Stop reason: ALTCHOICE

## 2023-07-05 RX ORDER — OXCARBAZEPINE 300 MG/1
300 TABLET, FILM COATED ORAL
COMMUNITY
Start: 2009-02-03 | End: 2023-07-17 | Stop reason: ALTCHOICE

## 2023-07-05 RX ORDER — HYDROXYZINE HYDROCHLORIDE 50 MG/1
50 TABLET, FILM COATED ORAL NIGHTLY PRN
COMMUNITY
Start: 2020-09-10 | End: 2023-07-17 | Stop reason: ALTCHOICE

## 2023-07-05 RX ORDER — MELOXICAM 7.5 MG/1
7.5 TABLET ORAL
COMMUNITY
Start: 2020-09-23 | End: 2023-10-30 | Stop reason: ALTCHOICE

## 2023-07-05 RX ORDER — PREGABALIN 50 MG/1
50 CAPSULE ORAL DAILY
COMMUNITY
Start: 2010-04-01 | End: 2023-07-17 | Stop reason: ALTCHOICE

## 2023-07-05 RX ORDER — CLONAZEPAM 0.5 MG/1
0.5 TABLET ORAL
COMMUNITY
Start: 2020-09-14 | End: 2023-07-17 | Stop reason: ALTCHOICE

## 2023-07-10 ENCOUNTER — APPOINTMENT (OUTPATIENT)
Dept: PRIMARY CARE | Facility: CLINIC | Age: 73
End: 2023-07-10
Payer: COMMERCIAL

## 2023-07-10 DIAGNOSIS — J44.9 CHRONIC OBSTRUCTIVE PULMONARY DISEASE, UNSPECIFIED COPD TYPE (MULTI): ICD-10-CM

## 2023-07-10 DIAGNOSIS — E78.2 MIXED HYPERLIPIDEMIA: ICD-10-CM

## 2023-07-10 DIAGNOSIS — G25.81 RESTLESS LEG SYNDROME: ICD-10-CM

## 2023-07-10 DIAGNOSIS — I10 PRIMARY HYPERTENSION: ICD-10-CM

## 2023-07-10 RX ORDER — TIOTROPIUM BROMIDE INHALATION SPRAY 3.12 UG/1
2 SPRAY, METERED RESPIRATORY (INHALATION) DAILY
Qty: 3 EACH | Refills: 0 | Status: SHIPPED | OUTPATIENT
Start: 2023-07-10 | End: 2023-10-30 | Stop reason: SDUPTHER

## 2023-07-10 RX ORDER — ATORVASTATIN CALCIUM 10 MG/1
10 TABLET, FILM COATED ORAL NIGHTLY
Qty: 90 TABLET | Refills: 0 | Status: SHIPPED | OUTPATIENT
Start: 2023-07-10 | End: 2023-07-26

## 2023-07-10 RX ORDER — NAPROXEN 500 MG/1
500 TABLET ORAL
Qty: 180 TABLET | Refills: 0 | Status: SHIPPED | OUTPATIENT
Start: 2023-07-10 | End: 2023-10-08

## 2023-07-10 RX ORDER — METOPROLOL SUCCINATE 50 MG/1
50 TABLET, EXTENDED RELEASE ORAL DAILY
Qty: 90 TABLET | Refills: 0 | Status: SHIPPED | OUTPATIENT
Start: 2023-07-10 | End: 2023-10-30 | Stop reason: SDUPTHER

## 2023-07-10 RX ORDER — CYCLOBENZAPRINE HCL 10 MG
10 TABLET ORAL NIGHTLY PRN
Qty: 90 TABLET | Refills: 0 | Status: SHIPPED | OUTPATIENT
Start: 2023-07-10 | End: 2023-10-30 | Stop reason: ALTCHOICE

## 2023-07-17 ENCOUNTER — OFFICE VISIT (OUTPATIENT)
Dept: PRIMARY CARE | Facility: CLINIC | Age: 73
End: 2023-07-17
Payer: COMMERCIAL

## 2023-07-17 VITALS
SYSTOLIC BLOOD PRESSURE: 103 MMHG | TEMPERATURE: 95.1 F | WEIGHT: 200.2 LBS | HEART RATE: 79 BPM | DIASTOLIC BLOOD PRESSURE: 65 MMHG | HEIGHT: 67 IN | BODY MASS INDEX: 31.42 KG/M2 | OXYGEN SATURATION: 95 %

## 2023-07-17 DIAGNOSIS — E78.5 HYPERLIPIDEMIA, UNSPECIFIED HYPERLIPIDEMIA TYPE: ICD-10-CM

## 2023-07-17 DIAGNOSIS — K21.9 GASTROESOPHAGEAL REFLUX DISEASE WITHOUT ESOPHAGITIS: ICD-10-CM

## 2023-07-17 DIAGNOSIS — E55.9 VITAMIN D DEFICIENCY: ICD-10-CM

## 2023-07-17 DIAGNOSIS — Z00.00 MEDICARE ANNUAL WELLNESS VISIT, SUBSEQUENT: Primary | ICD-10-CM

## 2023-07-17 DIAGNOSIS — G25.81 RESTLESS LEG SYNDROME: ICD-10-CM

## 2023-07-17 DIAGNOSIS — R56.9 SEIZURES (MULTI): ICD-10-CM

## 2023-07-17 DIAGNOSIS — R06.83 SNORING: ICD-10-CM

## 2023-07-17 DIAGNOSIS — R53.83 OTHER FATIGUE: ICD-10-CM

## 2023-07-17 DIAGNOSIS — F17.210 CIGARETTE SMOKER: ICD-10-CM

## 2023-07-17 DIAGNOSIS — Z12.31 SCREENING MAMMOGRAM, ENCOUNTER FOR: ICD-10-CM

## 2023-07-17 DIAGNOSIS — I10 PRIMARY HYPERTENSION: ICD-10-CM

## 2023-07-17 PROCEDURE — 4004F PT TOBACCO SCREEN RCVD TLK: CPT | Performed by: PHYSICIAN ASSISTANT

## 2023-07-17 PROCEDURE — 3074F SYST BP LT 130 MM HG: CPT | Performed by: PHYSICIAN ASSISTANT

## 2023-07-17 PROCEDURE — 3078F DIAST BP <80 MM HG: CPT | Performed by: PHYSICIAN ASSISTANT

## 2023-07-17 PROCEDURE — G0439 PPPS, SUBSEQ VISIT: HCPCS | Performed by: PHYSICIAN ASSISTANT

## 2023-07-17 PROCEDURE — 1170F FXNL STATUS ASSESSED: CPT | Performed by: PHYSICIAN ASSISTANT

## 2023-07-17 PROCEDURE — 1160F RVW MEDS BY RX/DR IN RCRD: CPT | Performed by: PHYSICIAN ASSISTANT

## 2023-07-17 PROCEDURE — 1159F MED LIST DOCD IN RCRD: CPT | Performed by: PHYSICIAN ASSISTANT

## 2023-07-17 PROCEDURE — 99214 OFFICE O/P EST MOD 30 MIN: CPT | Performed by: PHYSICIAN ASSISTANT

## 2023-07-17 ASSESSMENT — ACTIVITIES OF DAILY LIVING (ADL)
DOING_HOUSEWORK: INDEPENDENT
BATHING: INDEPENDENT
TAKING_MEDICATION: INDEPENDENT
DRESSING: INDEPENDENT
GROCERY_SHOPPING: INDEPENDENT
MANAGING_FINANCES: INDEPENDENT
DRESSING: INDEPENDENT
BATHING: INDEPENDENT

## 2023-07-17 NOTE — PROGRESS NOTES
Subjective   Reason for Visit: Jeane Rivas is an 73 y.o. female here for a Medicare Wellness visit.          Reviewed all medications by prescribing practitioner or clinical pharmacist (such as prescriptions, OTCs, herbal therapies and supplements) and documented in the medical record.    HPI  Colon:   AAA: (smoke >100 cig as a man or fm Hx)  Screening DM: 3/23  Mammo:   Pap: n/a  Dexa: declined  Lipids: 3/23   EK/22  Carotids (smokers):   Low dose CT chest (smokers): 10/20, declines today  Tobacco Cessation: declined    C/o  Feeling fatigued- wakes at 9am and then at 11am she feels like she needs to go back to bed. She naps during the day. Family says that she snores. Never feels rested after sleeping. She has a h/o RLS      HTN- patient is compliant with Metoprolol and does not complain of any chest pain, shortness of breath, lower extremity edema, or palpitations. Patient does check blood pressure at home. Pt has lost 6#.     HLD- patient states he tries to maintain low cholesterol diet and is consistent with taking cholesterol medications.      COPD- patient is continuing to use her Spiriva and denies any worsening shortness of breath above baseline. She still continues to smoke 1/2 pack a day, despite our advice and encouragement to stop. Does not see Pulm.      GERD- patient states that PPIs are no longer necessary and denies any breakthrough heartburn. Patient also denies any obvious changes in color of stool and signs of a GI bleed.      Vitamin D deficiency- She is currently only on 2000 units QD OTC.  Declines DEXA scan.      Patient has a history of thyroid nodules-   She had thyroid surgery 22, need repeat labs. Bx was benign. No f/up necessary      Bipolar, Depressed mood- Pt being managed by by psychiatry. Patient states her mood is stable.     Pt has low back pain and utilizes naproxen & m/ relaxer to manage sxs.      Seizures- stable on current meds. No recent seizure.  "    RLS- Pt is no flexeril at night for the RLS.        Patient Self Assessment of Health Status  Patient Self Assessment: Good    Nutrition and Exercise  Current Diet: Well Balanced Diet  Adequate Fluid Intake: Yes  Caffeine: Yes  Exercise Frequency: Infrequently    Functional Ability/Level of Safety  Cognitive Impairment Observed: No cognitive impairment observed    Home Safety Risk Factors: None    Patient Care Team:  Yanni Womack PA-C as PCP - General     Review of Systems  Constitutional: Patient denies any fever, chills, loss of appetite, or unexplained weight loss.  Cardiovascular: Patient denies any chest pain, shortness of breath with exertion, tachycardia, palpitations, orthopnea, or paroxysmal nocturnal dyspnea.  Respiratory: Patient denies any cough, shortness breath, or wheezing.  Gastrointestinal patient denies any nausea, vomiting, diarrhea, constipation, melena, hematochezia, or reflux symptoms  Skin: Denies any rashes or skin lesions   Neurology: Patient denies any new motor or sensory losses. Denies any numbness, tingling, weakness, and incoordination of the extremities. Patient also denies any tremor, seizures, or gait instability.  Endocrinology: Denies any polyuria, polydipsia, polyphagia, or heat/cold intolerance.  + Patient is complaining of fatigue-worse since catching COVID. she cannot get out of bed in AM, is always tired and unmotivated.     Objective   Vitals:  /65   Pulse 79   Temp 35.1 °C (95.1 °F)   Ht 1.689 m (5' 6.5\")   Wt 90.8 kg (200 lb 3.2 oz)   SpO2 95%   BMI 31.83 kg/m²       Physical Exam  Gen. appearance: Alert and cooperative, in no acute distress, well-developed, well-nourished overweight female.  Neck: Supple and without adenopathy or rigidity.  There is no JVD at 90° and no carotid bruits are noted.  There is no thyromegaly, thyroid tenderness, or palpable thyroid nodules.  Heart: Regular rate and rhythm without murmur or ectopy.  Lungs: Lungs are clear " to auscultation bilaterally with good air exchange.  Skin: Good turgor, moist, warm and without rashes or lesions.  Neurological exam: Alert and oriented ×3, no tremor, normal gait.  Extremities: No clubbing, cyanosis, or edema      Assessment/Plan     MCW:  Colon: , javier   AAA: (smoke >100 cig as a man or fm Hx)  Screening DM: 3/23  Mammo: , ordered today  Pap: n/a  Dexa: declined   Lipids: 3/23   EK/22  Carotids (smokers):   Low dose CT chest (smokers): 10/20  Tobacco Cessation: declined    HTN: Blood pressure appears adequately controlled and we will continue with the current antihypertensive therapy.     Hyperlipidemia: Patient will continue with the current medication. Dietary changes, exercise, and maintenance of healthy weight were discussed at length.     COPD: Stable based on symptoms.  Pt will continue the current medication.  No exacerbations since last appt.    Patient is experiencing fatigue that is undetectable despite hours of sleep. Feeling fatigued- wakes at 9am and then at 11am she feels like she needs to go back to bed. She naps during the day. Family says that she snores. Never feels rested after sleeping. She has a h/o RLS.      GERD: Stable. Stopped Nexium and feels fine. No heartburn sx, no blood in stool.     Thyroid nodule:   3/14/2022: Pt underwent a left hemithyroidectomy.  Pathology report revealed:  FOLLICULAR NODULAR DISEASE WITH DEGENERATION OF DOMINANT NODULE: THYROID, LEFT  HEMITHYROIDECTOMY SPECIMEN  She states that no f/up was needed.      Low back pain: Stable on the current medications.  We will continue with the same.     Vitamin D deficiency: She is to continue with her vitamin D supplementation.     Seizures: Stable on the current medication.  She will continue with the same.     Bipolar Disorder: Stable.  Pt will continue to follow with psychiatry.     Tobacco abuse: Patient understands that continued smoking will increase the risks of lung disease,  vascular disease, and multiple malignancies.   She is due for low-dose chest CT for lung cancer screening but has declined that today.  Patient was told at any point time she changes her mind she should call.    Patient is to return to the clinic in 3 months with labs prior.

## 2023-07-25 DIAGNOSIS — E78.2 MIXED HYPERLIPIDEMIA: ICD-10-CM

## 2023-07-26 RX ORDER — ATORVASTATIN CALCIUM 10 MG/1
10 TABLET, FILM COATED ORAL NIGHTLY
Qty: 90 TABLET | Refills: 0 | Status: SHIPPED | OUTPATIENT
Start: 2023-07-26 | End: 2023-10-30 | Stop reason: SDUPTHER

## 2023-10-10 ENCOUNTER — CLINICAL SUPPORT (OUTPATIENT)
Dept: SLEEP MEDICINE | Facility: HOSPITAL | Age: 73
End: 2023-10-10
Payer: COMMERCIAL

## 2023-10-10 DIAGNOSIS — R06.83 SNORING: ICD-10-CM

## 2023-10-10 DIAGNOSIS — R53.83 OTHER FATIGUE: ICD-10-CM

## 2023-10-10 DIAGNOSIS — G47.10 HYPERSOMNIA, UNSPECIFIED: ICD-10-CM

## 2023-10-10 PROCEDURE — 95806 SLEEP STUDY UNATT&RESP EFFT: CPT | Performed by: PSYCHIATRY & NEUROLOGY

## 2023-10-10 NOTE — PROGRESS NOTES
The patient received equipment and instructions for use of the [facility] Nomad HSAT device. The patient was instructed how to apply the effort belts, cannula, thermistor. It was also explained how the Nomad and oximeter components work.  The patient was asked to record their sleep for an 8-hour period.         The patient was informed of their responsibility for the device and acknowledged this by signing the HSAT device contract. The patient was asked to return the device on 10/11/2023 between the hours of 9:00 A.M. to the [facility].         The patient was instructed to call 911 as usual for any medical- emergencies while at home.  The patient was also given a phone number for troubleshooting when using the device in case there were additional questions.

## 2023-10-18 ENCOUNTER — APPOINTMENT (OUTPATIENT)
Dept: PRIMARY CARE | Facility: CLINIC | Age: 73
End: 2023-10-18
Payer: COMMERCIAL

## 2023-10-19 DIAGNOSIS — I10 PRIMARY HYPERTENSION: ICD-10-CM

## 2023-10-19 RX ORDER — METOPROLOL SUCCINATE 50 MG/1
50 TABLET, EXTENDED RELEASE ORAL DAILY
Qty: 90 TABLET | Refills: 0 | OUTPATIENT
Start: 2023-10-19

## 2023-10-24 ENCOUNTER — TELEPHONE (OUTPATIENT)
Dept: PRIMARY CARE | Facility: CLINIC | Age: 73
End: 2023-10-24
Payer: COMMERCIAL

## 2023-10-24 NOTE — TELEPHONE ENCOUNTER
"----- Message from Yanni Womack PA-C sent at 10/23/2023  3:28 PM EDT -----  Please call the patient regarding her sleep study--- there appears to be no severe sleep apnea but she does have \"sleep quality issues\" when laying on her back.   Sleep positioning therapy (tennis balls) could be considered to minimize the VIRGIL   below threshold.   The advise of the sleep specialist is that she should put a tennis ball in a sock and safety pin it to the back of her shirt when sleeping so that she can avoid rolling over on her back.    Otherwise, no other action needed.     "

## 2023-10-30 ENCOUNTER — OFFICE VISIT (OUTPATIENT)
Dept: PRIMARY CARE | Facility: CLINIC | Age: 73
End: 2023-10-30
Payer: COMMERCIAL

## 2023-10-30 DIAGNOSIS — E78.2 MIXED HYPERLIPIDEMIA: ICD-10-CM

## 2023-10-30 DIAGNOSIS — E55.9 VITAMIN D DEFICIENCY: ICD-10-CM

## 2023-10-30 DIAGNOSIS — K21.9 GASTROESOPHAGEAL REFLUX DISEASE WITHOUT ESOPHAGITIS: ICD-10-CM

## 2023-10-30 DIAGNOSIS — I10 PRIMARY HYPERTENSION: Primary | ICD-10-CM

## 2023-10-30 DIAGNOSIS — J44.9 CHRONIC OBSTRUCTIVE PULMONARY DISEASE, UNSPECIFIED COPD TYPE (MULTI): ICD-10-CM

## 2023-10-30 DIAGNOSIS — F31.9 BIPOLAR DEPRESSION (MULTI): ICD-10-CM

## 2023-10-30 DIAGNOSIS — G25.81 RESTLESS LEG SYNDROME: ICD-10-CM

## 2023-10-30 DIAGNOSIS — R56.9 SEIZURES (MULTI): ICD-10-CM

## 2023-10-30 PROCEDURE — 1159F MED LIST DOCD IN RCRD: CPT | Performed by: PHYSICIAN ASSISTANT

## 2023-10-30 PROCEDURE — 1160F RVW MEDS BY RX/DR IN RCRD: CPT | Performed by: PHYSICIAN ASSISTANT

## 2023-10-30 PROCEDURE — 3077F SYST BP >= 140 MM HG: CPT | Performed by: PHYSICIAN ASSISTANT

## 2023-10-30 PROCEDURE — 3080F DIAST BP >= 90 MM HG: CPT | Performed by: PHYSICIAN ASSISTANT

## 2023-10-30 PROCEDURE — 99214 OFFICE O/P EST MOD 30 MIN: CPT | Performed by: PHYSICIAN ASSISTANT

## 2023-10-30 PROCEDURE — 4004F PT TOBACCO SCREEN RCVD TLK: CPT | Performed by: PHYSICIAN ASSISTANT

## 2023-10-30 RX ORDER — ESOMEPRAZOLE MAGNESIUM 40 MG/1
40 CAPSULE, DELAYED RELEASE ORAL 2 TIMES DAILY
Qty: 60 CAPSULE | Refills: 1 | Status: SHIPPED | OUTPATIENT
Start: 2023-10-30 | End: 2024-01-31 | Stop reason: SDUPTHER

## 2023-10-30 RX ORDER — ATORVASTATIN CALCIUM 10 MG/1
10 TABLET, FILM COATED ORAL NIGHTLY
Qty: 90 TABLET | Refills: 0 | Status: SHIPPED | OUTPATIENT
Start: 2023-10-30 | End: 2024-01-31 | Stop reason: SDUPTHER

## 2023-10-30 RX ORDER — GABAPENTIN 100 MG/1
100 CAPSULE ORAL 2 TIMES DAILY
Qty: 60 CAPSULE | Refills: 2 | Status: SHIPPED | OUTPATIENT
Start: 2023-10-30 | End: 2024-01-31 | Stop reason: SDUPTHER

## 2023-10-30 RX ORDER — TIOTROPIUM BROMIDE INHALATION SPRAY 3.12 UG/1
2 SPRAY, METERED RESPIRATORY (INHALATION) DAILY
Qty: 3 EACH | Refills: 0 | Status: SHIPPED | OUTPATIENT
Start: 2023-10-30 | End: 2024-05-06

## 2023-10-30 RX ORDER — METOPROLOL SUCCINATE 50 MG/1
50 TABLET, EXTENDED RELEASE ORAL DAILY
Qty: 90 TABLET | Refills: 0 | Status: SHIPPED | OUTPATIENT
Start: 2023-10-30 | End: 2024-01-31 | Stop reason: SDUPTHER

## 2023-10-30 ASSESSMENT — PATIENT HEALTH QUESTIONNAIRE - PHQ9
2. FEELING DOWN, DEPRESSED OR HOPELESS: NOT AT ALL
SUM OF ALL RESPONSES TO PHQ9 QUESTIONS 1 AND 2: 0
1. LITTLE INTEREST OR PLEASURE IN DOING THINGS: NOT AT ALL

## 2023-10-30 NOTE — PROGRESS NOTES
Subjective   Patient ID: Jeane Rivas is a 73 y.o. female who presents for Follow-up.    HPI     Patient wanted to switch from cyclobenzaprine to gabapentin.     Patient also wanted to get back onto Omeprazole.      HTN- patient is compliant with Metoprolol and does not complain of any chest pain, shortness of breath, lower extremity edema, or palpitations. Patient does check blood pressure at home. Weight is stable.     HLD- patient states he tries to maintain low cholesterol diet and is consistent with taking cholesterol medications.      COPD- patient is continuing to use her Spiriva and denies any worsening shortness of breath above baseline. She still continues to smoke 1/2 pack a day, despite our advice and encouragement to stop. Does not see Pulm.      GERD- patient states that PPIs working well to control symptoms and denies any breakthrough heartburn. Patient also denies any obvious changes in color of stool and signs of a GI bleed.      Vitamin D deficiency- She is currently only on 2000 units QD OTC.  Declines DEXA scan.      Patient has a history of thyroid nodules-   She had thyroid surgery 2/24/22, need repeat labs. Bx was benign     Bipolar, Depressed mood- Pt being managed by by psychiatry. Patient states her mood is stable.     Pt has low back pain and utilizes naproxen & m/ relaxer to manage sxs.      Seizures- stable on current meds. No recent seizure.      RLS- didn't like flexeril tried Gabapentin from dtr and it worked well.   She wants to have gabapentin for herself to use for RLS.     Pt has not had labs done yet.      Review of Systems  Constitutional: Patient denies any fever, chills, loss of appetite, or unexplained weight loss.  Cardiovascular: Patient denies any chest pain, shortness of breath with exertion, tachycardia, palpitations, orthopnea, or paroxysmal nocturnal dyspnea.  Respiratory: Patient denies any cough, shortness breath, or wheezing.  Gastrointestinal patient denies any  "nausea, vomiting, diarrhea, constipation, melena, hematochezia, or reflux symptoms  Skin: Denies any rashes or skin lesions   Neurology: Patient denies any new motor or sensory losses.  Denies any numbness, tingling, weakness, and incoordination of the extremities.  Patient also denies any tremor, seizures, or gait instability.  Endocrinology: Denies any polyuria, polydipsia, polyphagia, or heat/cold intolerance.    Objective   BP (!) 152/96   Pulse 93   Temp 36.5 °C (97.7 °F)   Ht 1.676 m (5' 6\")   Wt 95.7 kg (211 lb)   SpO2 94%   BMI 34.06 kg/m²     Physical Exam  Gen. appearance: Alert and cooperative, in no acute distress, well-developed, well-nourished overweight female.  Neck: Supple and without adenopathy or rigidity.  There is no JVD at 90° and no carotid bruits are noted.  There is no thyromegaly, thyroid tenderness, or palpable thyroid nodules.  Heart: Regular rate and rhythm without murmur or ectopy.  Lungs: Lungs are clear to auscultation bilaterally with good air exchange.  Skin: Good turgor, moist, warm and without rashes or lesions.  Neurological exam: Alert and oriented ×3, no tremor, normal gait.  Extremities: No clubbing, cyanosis, or edema    Assessment/Plan   Diagnoses and all orders for this visit:  Colon: , javier   AAA: (smoke >100 cig as a man or fm Hx)  Screening DM: 3/23  Mammo: , declined despite my advice and encouragement  Pap: n/a  Dexa: declined   EK/22  Carotids (smokers):   Low dose CT chest (smokers): 10/20  Tobacco Cessation: declined     Primary hypertension  -     metoprolol succinate XL (Toprol-XL) 50 mg 24 hr tablet; Take 1 tablet (50 mg) by mouth once daily.  -     CBC and Auto Differential; Future  Patient will continue all current antihypertensives and we will continue to monitor.  Currently blood pressure stable in the office and patient is without side effects to treatment.  Encouraged patient low salt diet and increase physical activity.    Mixed " hyperlipidemia  -     atorvastatin (Lipitor) 10 mg tablet; Take 1 tablet (10 mg) by mouth once daily at bedtime.  -     Comprehensive Metabolic Panel; Future  -     Lipid Panel; Future  -     TSH with reflex to Free T4 if abnormal; Future  Patient will continue current dose of Lipitor.  She is overdue to have her lipids done so that will be done this week and we will call with that result as soon as it is reviewed.  She denies any symptoms of unstable angina and her EKG is up-to-date.    Chronic obstructive pulmonary disease, unspecified COPD type (CMS/[facility])  -     tiotropium (Spiriva Respimat) 2.5 mcg/actuation inhaler; Inhale 2 puffs once daily.  Patient symptoms are currently stable.  She denies any symptoms of shortness of breath.  She declines any smoking cessation products.    Gastroesophageal reflux disease without esophagitis  -     esomeprazole (NexIUM) 40 mg DR capsule; Take 1 capsule (40 mg) by mouth 2 times a day. Do not open capsule.  GERD- patient will continue with PPI use and diet modifications to decrease symptoms of GERD.    Restless leg syndrome  -     gabapentin (Neurontin) 100 mg capsule; Take 1 capsule (100 mg) by mouth 2 times a day.  Did not tolerate Requip and constantly had to increase dosing.  She did try one of her daughters gabapentin and stated that it worked well for her restless leg.  We will switch her over to Neurontin.  An OARRS review done.    Vitamin D deficiency  -     Vitamin D 1,25 Dihydroxy (for eval of hypercalcemia); Future  Patient is currently on over-the-counter supplementation.  She will continue current dosing until we get her most recent lab result back.    Seizures: Stable on the current medication.  She will continue with the same.     Bipolar Disorder: Stable.  Pt will continue to follow with psychiatry.    Tobacco abuse: Patient understands that continued smoking will increase the risks of lung disease, vascular disease, and multiple malignancies.   She is due for  low-dose chest CT for lung cancer screening but has declined that today.  Patient was told at any point time she changes her mind she should call.    Patient is to return to clinic in 3 months or sooner as needed.  Labs are to be done this week.    Patient understands that should they have testing outside   facilities that we may not receive the results and was told to call us if they have not heard from our office within a week after testing.    [facility] uses voice recognition technology for dictations. Sometimes the software misinterprets words. Please take this into account when reading this.

## 2023-11-01 VITALS
TEMPERATURE: 97.7 F | DIASTOLIC BLOOD PRESSURE: 96 MMHG | HEART RATE: 93 BPM | OXYGEN SATURATION: 94 % | SYSTOLIC BLOOD PRESSURE: 152 MMHG | WEIGHT: 211 LBS | BODY MASS INDEX: 33.91 KG/M2 | HEIGHT: 66 IN

## 2023-11-09 ENCOUNTER — TELEPHONE (OUTPATIENT)
Dept: PRIMARY CARE | Facility: CLINIC | Age: 73
End: 2023-11-09
Payer: COMMERCIAL

## 2023-12-16 ENCOUNTER — LAB (OUTPATIENT)
Dept: LAB | Facility: LAB | Age: 73
End: 2023-12-16
Payer: COMMERCIAL

## 2023-12-16 DIAGNOSIS — I10 PRIMARY HYPERTENSION: ICD-10-CM

## 2023-12-16 DIAGNOSIS — E78.2 MIXED HYPERLIPIDEMIA: ICD-10-CM

## 2023-12-16 DIAGNOSIS — E55.9 VITAMIN D DEFICIENCY: ICD-10-CM

## 2023-12-16 LAB
ALBUMIN SERPL BCP-MCNC: 3.8 G/DL (ref 3.4–5)
ALP SERPL-CCNC: 43 U/L (ref 33–136)
ALT SERPL W P-5'-P-CCNC: 6 U/L (ref 7–45)
ANION GAP SERPL CALC-SCNC: 9 MMOL/L (ref 10–20)
AST SERPL W P-5'-P-CCNC: 11 U/L (ref 9–39)
BASOPHILS # BLD AUTO: 0.05 X10*3/UL (ref 0–0.1)
BASOPHILS NFR BLD AUTO: 0.9 %
BILIRUB SERPL-MCNC: 0.3 MG/DL (ref 0–1.2)
BUN SERPL-MCNC: 20 MG/DL (ref 6–23)
CALCIUM SERPL-MCNC: 9.2 MG/DL (ref 8.6–10.3)
CHLORIDE SERPL-SCNC: 103 MMOL/L (ref 98–107)
CHOLEST SERPL-MCNC: 143 MG/DL (ref 0–199)
CHOLESTEROL/HDL RATIO: 3.5
CO2 SERPL-SCNC: 31 MMOL/L (ref 21–32)
CREAT SERPL-MCNC: 0.97 MG/DL (ref 0.5–1.05)
EOSINOPHIL # BLD AUTO: 0.16 X10*3/UL (ref 0–0.4)
EOSINOPHIL NFR BLD AUTO: 2.8 %
ERYTHROCYTE [DISTWIDTH] IN BLOOD BY AUTOMATED COUNT: 13.7 % (ref 11.5–14.5)
GFR SERPL CREATININE-BSD FRML MDRD: 63 ML/MIN/1.73M*2
GLUCOSE SERPL-MCNC: 93 MG/DL (ref 74–99)
HCT VFR BLD AUTO: 37.5 % (ref 36–46)
HDLC SERPL-MCNC: 40.6 MG/DL
HGB BLD-MCNC: 12.4 G/DL (ref 12–16)
IMM GRANULOCYTES # BLD AUTO: 0.03 X10*3/UL (ref 0–0.5)
IMM GRANULOCYTES NFR BLD AUTO: 0.5 % (ref 0–0.9)
LDLC SERPL CALC-MCNC: 76 MG/DL
LYMPHOCYTES # BLD AUTO: 1.8 X10*3/UL (ref 0.8–3)
LYMPHOCYTES NFR BLD AUTO: 32 %
MCH RBC QN AUTO: 32.2 PG (ref 26–34)
MCHC RBC AUTO-ENTMCNC: 33.1 G/DL (ref 32–36)
MCV RBC AUTO: 97 FL (ref 80–100)
MONOCYTES # BLD AUTO: 0.71 X10*3/UL (ref 0.05–0.8)
MONOCYTES NFR BLD AUTO: 12.6 %
NEUTROPHILS # BLD AUTO: 2.88 X10*3/UL (ref 1.6–5.5)
NEUTROPHILS NFR BLD AUTO: 51.2 %
NON HDL CHOLESTEROL: 102 MG/DL (ref 0–149)
NRBC BLD-RTO: 0 /100 WBCS (ref 0–0)
PLATELET # BLD AUTO: 213 X10*3/UL (ref 150–450)
POTASSIUM SERPL-SCNC: 4 MMOL/L (ref 3.5–5.3)
PROT SERPL-MCNC: 6.1 G/DL (ref 6.4–8.2)
RBC # BLD AUTO: 3.85 X10*6/UL (ref 4–5.2)
SODIUM SERPL-SCNC: 139 MMOL/L (ref 136–145)
T4 FREE SERPL-MCNC: 1.06 NG/DL (ref 0.61–1.12)
TRIGL SERPL-MCNC: 134 MG/DL (ref 0–149)
TSH SERPL-ACNC: 4.72 MIU/L (ref 0.44–3.98)
VLDL: 27 MG/DL (ref 0–40)
WBC # BLD AUTO: 5.6 X10*3/UL (ref 4.4–11.3)

## 2023-12-16 PROCEDURE — 84439 ASSAY OF FREE THYROXINE: CPT

## 2023-12-16 PROCEDURE — 80053 COMPREHEN METABOLIC PANEL: CPT

## 2023-12-16 PROCEDURE — 84443 ASSAY THYROID STIM HORMONE: CPT

## 2023-12-16 PROCEDURE — 80061 LIPID PANEL: CPT

## 2023-12-16 PROCEDURE — 82652 VIT D 1 25-DIHYDROXY: CPT

## 2023-12-16 PROCEDURE — 85025 COMPLETE CBC W/AUTO DIFF WBC: CPT

## 2023-12-16 PROCEDURE — 36415 COLL VENOUS BLD VENIPUNCTURE: CPT

## 2023-12-18 LAB — 1,25(OH)2D3 SERPL-MCNC: 39.4 PG/ML (ref 19.9–79.3)

## 2024-01-31 ENCOUNTER — OFFICE VISIT (OUTPATIENT)
Dept: PRIMARY CARE | Facility: CLINIC | Age: 74
End: 2024-01-31
Payer: COMMERCIAL

## 2024-01-31 VITALS
SYSTOLIC BLOOD PRESSURE: 115 MMHG | WEIGHT: 194.7 LBS | OXYGEN SATURATION: 93 % | BODY MASS INDEX: 30.56 KG/M2 | HEIGHT: 67 IN | TEMPERATURE: 96.1 F | HEART RATE: 87 BPM | DIASTOLIC BLOOD PRESSURE: 69 MMHG

## 2024-01-31 DIAGNOSIS — E55.9 VITAMIN D DEFICIENCY: ICD-10-CM

## 2024-01-31 DIAGNOSIS — I10 PRIMARY HYPERTENSION: ICD-10-CM

## 2024-01-31 DIAGNOSIS — K21.9 GASTROESOPHAGEAL REFLUX DISEASE WITHOUT ESOPHAGITIS: ICD-10-CM

## 2024-01-31 DIAGNOSIS — F31.9 BIPOLAR DEPRESSION (MULTI): ICD-10-CM

## 2024-01-31 DIAGNOSIS — R56.9 SEIZURES (MULTI): ICD-10-CM

## 2024-01-31 DIAGNOSIS — G25.81 RESTLESS LEG SYNDROME: ICD-10-CM

## 2024-01-31 DIAGNOSIS — E03.9 HYPOTHYROIDISM, UNSPECIFIED TYPE: ICD-10-CM

## 2024-01-31 DIAGNOSIS — J44.9 CHRONIC OBSTRUCTIVE PULMONARY DISEASE, UNSPECIFIED COPD TYPE (MULTI): ICD-10-CM

## 2024-01-31 DIAGNOSIS — E78.2 MIXED HYPERLIPIDEMIA: ICD-10-CM

## 2024-01-31 DIAGNOSIS — Z00.00 MEDICARE ANNUAL WELLNESS VISIT, SUBSEQUENT: Primary | ICD-10-CM

## 2024-01-31 PROCEDURE — 3074F SYST BP LT 130 MM HG: CPT | Performed by: PHYSICIAN ASSISTANT

## 2024-01-31 PROCEDURE — G0439 PPPS, SUBSEQ VISIT: HCPCS | Performed by: PHYSICIAN ASSISTANT

## 2024-01-31 PROCEDURE — 1170F FXNL STATUS ASSESSED: CPT | Performed by: PHYSICIAN ASSISTANT

## 2024-01-31 PROCEDURE — 1159F MED LIST DOCD IN RCRD: CPT | Performed by: PHYSICIAN ASSISTANT

## 2024-01-31 PROCEDURE — 3078F DIAST BP <80 MM HG: CPT | Performed by: PHYSICIAN ASSISTANT

## 2024-01-31 PROCEDURE — 1160F RVW MEDS BY RX/DR IN RCRD: CPT | Performed by: PHYSICIAN ASSISTANT

## 2024-01-31 PROCEDURE — 99214 OFFICE O/P EST MOD 30 MIN: CPT | Performed by: PHYSICIAN ASSISTANT

## 2024-01-31 RX ORDER — ATORVASTATIN CALCIUM 10 MG/1
10 TABLET, FILM COATED ORAL NIGHTLY
Qty: 90 TABLET | Refills: 0 | Status: SHIPPED | OUTPATIENT
Start: 2024-01-31 | End: 2024-05-06 | Stop reason: SDUPTHER

## 2024-01-31 RX ORDER — METOPROLOL SUCCINATE 50 MG/1
50 TABLET, EXTENDED RELEASE ORAL DAILY
Qty: 90 TABLET | Refills: 0 | Status: SHIPPED | OUTPATIENT
Start: 2024-01-31 | End: 2024-05-06 | Stop reason: SDUPTHER

## 2024-01-31 RX ORDER — AMITRIPTYLINE HYDROCHLORIDE 25 MG/1
25 TABLET, FILM COATED ORAL NIGHTLY
COMMUNITY

## 2024-01-31 RX ORDER — DULOXETIN HYDROCHLORIDE 60 MG/1
60 CAPSULE, DELAYED RELEASE ORAL 2 TIMES DAILY
Qty: 90 CAPSULE | Refills: 0 | Status: SHIPPED | OUTPATIENT
Start: 2024-01-31

## 2024-01-31 RX ORDER — ESOMEPRAZOLE MAGNESIUM 40 MG/1
40 CAPSULE, DELAYED RELEASE ORAL 2 TIMES DAILY
Qty: 60 CAPSULE | Refills: 1 | Status: SHIPPED | OUTPATIENT
Start: 2024-01-31 | End: 2024-05-06 | Stop reason: SDUPTHER

## 2024-01-31 RX ORDER — GABAPENTIN 100 MG/1
100 CAPSULE ORAL 3 TIMES DAILY
Qty: 270 CAPSULE | Refills: 0 | Status: SHIPPED | OUTPATIENT
Start: 2024-01-31 | End: 2024-05-06 | Stop reason: SDUPTHER

## 2024-01-31 ASSESSMENT — PATIENT HEALTH QUESTIONNAIRE - PHQ9
2. FEELING DOWN, DEPRESSED OR HOPELESS: NOT AT ALL
2. FEELING DOWN, DEPRESSED OR HOPELESS: NOT AT ALL
SUM OF ALL RESPONSES TO PHQ9 QUESTIONS 1 AND 2: 0
1. LITTLE INTEREST OR PLEASURE IN DOING THINGS: NOT AT ALL
SUM OF ALL RESPONSES TO PHQ9 QUESTIONS 1 AND 2: 0
1. LITTLE INTEREST OR PLEASURE IN DOING THINGS: NOT AT ALL

## 2024-01-31 ASSESSMENT — ACTIVITIES OF DAILY LIVING (ADL)
MANAGING_FINANCES: INDEPENDENT
BATHING: INDEPENDENT
GROCERY_SHOPPING: INDEPENDENT
DOING_HOUSEWORK: INDEPENDENT
DRESSING: INDEPENDENT
TAKING_MEDICATION: INDEPENDENT

## 2024-02-05 NOTE — PATIENT INSTRUCTIONS

## 2024-04-25 DIAGNOSIS — K21.9 GASTROESOPHAGEAL REFLUX DISEASE WITHOUT ESOPHAGITIS: ICD-10-CM

## 2024-04-25 RX ORDER — ESOMEPRAZOLE MAGNESIUM 40 MG/1
CAPSULE, DELAYED RELEASE ORAL
Qty: 60 CAPSULE | Refills: 0 | OUTPATIENT
Start: 2024-04-25

## 2024-05-01 ENCOUNTER — APPOINTMENT (OUTPATIENT)
Dept: PRIMARY CARE | Facility: CLINIC | Age: 74
End: 2024-05-01
Payer: COMMERCIAL

## 2024-05-06 ENCOUNTER — OFFICE VISIT (OUTPATIENT)
Dept: PRIMARY CARE | Facility: CLINIC | Age: 74
End: 2024-05-06
Payer: COMMERCIAL

## 2024-05-06 VITALS
WEIGHT: 202.9 LBS | DIASTOLIC BLOOD PRESSURE: 75 MMHG | SYSTOLIC BLOOD PRESSURE: 112 MMHG | HEIGHT: 66 IN | OXYGEN SATURATION: 95 % | TEMPERATURE: 97.9 F | BODY MASS INDEX: 32.61 KG/M2 | HEART RATE: 77 BPM

## 2024-05-06 DIAGNOSIS — I10 PRIMARY HYPERTENSION: Primary | ICD-10-CM

## 2024-05-06 DIAGNOSIS — G25.81 RESTLESS LEG SYNDROME: ICD-10-CM

## 2024-05-06 DIAGNOSIS — G89.29 CHRONIC BILATERAL LOW BACK PAIN, UNSPECIFIED WHETHER SCIATICA PRESENT: ICD-10-CM

## 2024-05-06 DIAGNOSIS — E78.2 MIXED HYPERLIPIDEMIA: ICD-10-CM

## 2024-05-06 DIAGNOSIS — F31.9 BIPOLAR DEPRESSION (MULTI): ICD-10-CM

## 2024-05-06 DIAGNOSIS — J44.9 CHRONIC OBSTRUCTIVE PULMONARY DISEASE, UNSPECIFIED COPD TYPE (MULTI): ICD-10-CM

## 2024-05-06 DIAGNOSIS — M54.50 CHRONIC BILATERAL LOW BACK PAIN, UNSPECIFIED WHETHER SCIATICA PRESENT: ICD-10-CM

## 2024-05-06 DIAGNOSIS — E55.9 VITAMIN D DEFICIENCY: ICD-10-CM

## 2024-05-06 DIAGNOSIS — K21.9 GASTROESOPHAGEAL REFLUX DISEASE WITHOUT ESOPHAGITIS: ICD-10-CM

## 2024-05-06 DIAGNOSIS — R56.9 SEIZURES (MULTI): ICD-10-CM

## 2024-05-06 PROCEDURE — 3078F DIAST BP <80 MM HG: CPT | Performed by: PHYSICIAN ASSISTANT

## 2024-05-06 PROCEDURE — 1160F RVW MEDS BY RX/DR IN RCRD: CPT | Performed by: PHYSICIAN ASSISTANT

## 2024-05-06 PROCEDURE — 3074F SYST BP LT 130 MM HG: CPT | Performed by: PHYSICIAN ASSISTANT

## 2024-05-06 PROCEDURE — 1159F MED LIST DOCD IN RCRD: CPT | Performed by: PHYSICIAN ASSISTANT

## 2024-05-06 PROCEDURE — 99213 OFFICE O/P EST LOW 20 MIN: CPT | Performed by: PHYSICIAN ASSISTANT

## 2024-05-06 RX ORDER — GABAPENTIN 100 MG/1
100 CAPSULE ORAL 3 TIMES DAILY
Qty: 270 CAPSULE | Refills: 0 | Status: SHIPPED | OUTPATIENT
Start: 2024-05-06 | End: 2024-08-04

## 2024-05-06 RX ORDER — ALBUTEROL SULFATE 90 UG/1
2 AEROSOL, METERED RESPIRATORY (INHALATION) EVERY 4 HOURS PRN
Qty: 18 G | Refills: 5 | Status: SHIPPED | OUTPATIENT
Start: 2024-05-06 | End: 2025-05-06

## 2024-05-06 RX ORDER — METOPROLOL SUCCINATE 50 MG/1
50 TABLET, EXTENDED RELEASE ORAL DAILY
Qty: 90 TABLET | Refills: 0 | Status: SHIPPED | OUTPATIENT
Start: 2024-05-06 | End: 2024-08-04

## 2024-05-06 RX ORDER — ESOMEPRAZOLE MAGNESIUM 40 MG/1
40 CAPSULE, DELAYED RELEASE ORAL 2 TIMES DAILY
Qty: 60 CAPSULE | Refills: 1 | Status: SHIPPED | OUTPATIENT
Start: 2024-05-06 | End: 2024-07-05

## 2024-05-06 RX ORDER — ATORVASTATIN CALCIUM 10 MG/1
10 TABLET, FILM COATED ORAL NIGHTLY
Qty: 90 TABLET | Refills: 0 | Status: SHIPPED | OUTPATIENT
Start: 2024-05-06

## 2024-05-06 ASSESSMENT — PATIENT HEALTH QUESTIONNAIRE - PHQ9
SUM OF ALL RESPONSES TO PHQ9 QUESTIONS 1 AND 2: 0
1. LITTLE INTEREST OR PLEASURE IN DOING THINGS: NOT AT ALL
2. FEELING DOWN, DEPRESSED OR HOPELESS: NOT AT ALL

## 2024-05-06 NOTE — PROGRESS NOTES
Subjective   Patient ID: Jeane Rivas is a 74 y.o. female who presents for Follow-up.    HPI     No new concerns   No recent BW, she didn't get what was ordered at last visit.   Mammo: 2022, declined despite my advice and encouragement  Colonoscopy: 2022, javier 3 y  DEXA- declined.     3 mo f/up    HTN- patient is compliant with Metoprolol and does not complain of any chest pain, shortness of breath, lower extremity edema, or palpitations. Patient does check blood pressure at home. Weight is stable.     HLD- patient states he tries to maintain low cholesterol diet and is consistent with taking cholesterol medications.      COPD- patient is continuing to use her Spiriva and denies any worsening shortness of breath above baseline. She still continues to smoke 1/2 pack a day, despite our advice and encouragement to stop. Does not see Pulm.      GERD- patient states that PPIs working well to control symptoms and denies any breakthrough heartburn. Patient also denies any obvious changes in color of stool and signs of a GI bleed.      Vitamin D deficiency- She is currently only on 2000 units QD OTC.  Declines DEXA scan.      Patient has a history of thyroid nodules-   She had thyroid surgery 2/24/22, need repeat labs. Bx was benign     Bipolar, Depressed mood- Pt being managed by by psychiatry. Patient states her mood is stable.     Pt has low back pain and utilizes naproxen & m/ relaxer to manage sxs.      Seizures- stable on current meds. No recent seizure.      RLS-she is currently using gabapentin but states that the dose needs to be increased because it has not working as well as it used to. She is currently on BID dosing.          Review of Systems  Constitutional: Patient denies any fever, chills, loss of appetite, or unexplained weight loss.  Cardiovascular: Patient denies any chest pain, shortness of breath with exertion, tachycardia, palpitations, orthopnea, or paroxysmal nocturnal dyspnea.  Respiratory:  "Patient denies any cough, shortness breath, or wheezing.  Gastrointestinal patient denies any nausea, vomiting, diarrhea, constipation, melena, hematochezia, or reflux symptoms  Skin: Denies any rashes or skin lesions   Neurology: Patient denies any new motor or sensory losses.  Denies any numbness, tingling, weakness, and incoordination of the extremities.  Patient also denies any tremor, seizures, or gait instability.  Endocrinology: Denies any polyuria, polydipsia, polyphagia, or heat/cold intolerance.    Objective   /75   Pulse 77   Temp 36.6 °C (97.9 °F)   Ht 1.676 m (5' 6\")   Wt 92 kg (202 lb 14.4 oz)   SpO2 95%   BMI 32.75 kg/m²     Physical Exam  Gen. appearance: Alert and cooperative, in no acute distress, well-developed, well-nourished elderly female.  Neck: Supple and without adenopathy or rigidity.  There is no JVD at 90° and no carotid bruits are noted.  There is no thyromegaly, thyroid tenderness, or palpable thyroid nodules.  Heart: Regular rate and rhythm without murmur or ectopy.  Lungs: Lungs are clear to auscultation bilaterally with good air exchange.  Skin: Good turgor, moist, warm and without rashes or lesions.  Neurological exam: Alert and oriented ×3, no tremor, normal gait.  Extremities: No clubbing, cyanosis, or edema    Assessment/Plan   Diagnoses and all orders for this visit:  Primary hypertension  -     metoprolol succinate XL (Toprol-XL) 50 mg 24 hr tablet; Take 1 tablet (50 mg) by mouth once daily.  HTN-patient will continue all current antihypertensives and we will continue to monitor.  Currently blood pressure stable in the office and patient is without side effects to treatment.  Encouraged patient low salt diet and increase physical activity.  Mixed hyperlipidemia  -     atorvastatin (Lipitor) 10 mg tablet; Take 1 tablet (10 mg) by mouth once daily at bedtime.  Patient is to continue her current dose of statin.  Her most recent LDL was 76 back in 12/16/2023.  " Triglycerides were 134, and HDL was 40.  She denies any symptoms of unstable angina at this time.    Chronic obstructive pulmonary disease, unspecified COPD type (Multi)  -     albuterol (Ventolin HFA) 90 mcg/actuation inhaler; Inhale 2 puffs every 4 hours if needed for wheezing or shortness of breath.  Patient symptoms are stable on Spiriva but she would like to have a rescue inhaler on hand if needed.  Rescue inhaler was prescribed.  She continues to smoke. Patient understands that continued smoking will increase the risks of lung disease, vascular disease, and multiple malignancies.    Gastroesophageal reflux disease without esophagitis  -     esomeprazole (NexIUM) 40 mg DR capsule; Take 1 capsule (40 mg) by mouth 2 times a day. Do not open capsule.  GERD- patient will continue with PPI use and diet modifications to decrease symptoms of GERD.    Restless leg syndrome  -     gabapentin (Neurontin) 100 mg capsule; Take 1 capsule (100 mg) by mouth 3 times a day.  Patient's symptoms are currently well-controlled on current dose of gabapentin.  She will continue current dosing and we will continue to monitor.  OARRS was reviewed.    Vitamin D deficiency-patient's last vitamin D level was 39 on 12/18/2023.  She will continue current dosing of supplementation and we will continue to monitor.    Bipolar depression (Multi)-patient continues to see a psychiatrist and be medicated.  She denies any symptoms of uncontrolled bipolar depression.  She also denies any thoughts of hurting herself or others.  She will continue to follow with her psychiatrist.    Chronic bilateral low back pain, unspecified whether sciatica present-patient will continue to use muscle relaxers and anti-inflammatories as needed.  Symptoms are currently stable.    Seizures (Multi)-stable.  We will continue to monitor.    Patient is to return to the clinic in 3 months with labs this week.  We will decide on further labs once these labs have been  reviewed.    Patient understands that should they have testing outside   facilities that we may not receive the results and was told to call us if they have not heard from our office within a week after testing.    [facility] uses voice recognition technology for dictations. Sometimes the software misinterprets words. Please take this into account when reading this.

## 2024-06-01 ENCOUNTER — LAB (OUTPATIENT)
Dept: LAB | Facility: LAB | Age: 74
End: 2024-06-01
Payer: COMMERCIAL

## 2024-06-01 DIAGNOSIS — E03.9 HYPOTHYROIDISM, UNSPECIFIED TYPE: ICD-10-CM

## 2024-06-01 DIAGNOSIS — Z79.899 OTHER LONG TERM (CURRENT) DRUG THERAPY: Primary | ICD-10-CM

## 2024-06-01 LAB
ALBUMIN SERPL BCP-MCNC: 3.9 G/DL (ref 3.4–5)
ALP SERPL-CCNC: 42 U/L (ref 33–136)
ALT SERPL W P-5'-P-CCNC: 9 U/L (ref 7–45)
ANION GAP SERPL CALC-SCNC: 11 MMOL/L (ref 10–20)
AST SERPL W P-5'-P-CCNC: 14 U/L (ref 9–39)
BASOPHILS # BLD AUTO: 0.04 X10*3/UL (ref 0–0.1)
BASOPHILS NFR BLD AUTO: 0.8 %
BILIRUB DIRECT SERPL-MCNC: 0.1 MG/DL (ref 0–0.3)
BILIRUB SERPL-MCNC: 0.4 MG/DL (ref 0–1.2)
BUN SERPL-MCNC: 18 MG/DL (ref 6–23)
CALCIUM SERPL-MCNC: 9.2 MG/DL (ref 8.6–10.3)
CHLORIDE SERPL-SCNC: 101 MMOL/L (ref 98–107)
CO2 SERPL-SCNC: 28 MMOL/L (ref 21–32)
CREAT SERPL-MCNC: 0.99 MG/DL (ref 0.5–1.05)
EGFRCR SERPLBLD CKD-EPI 2021: 61 ML/MIN/1.73M*2
EOSINOPHIL # BLD AUTO: 0.17 X10*3/UL (ref 0–0.4)
EOSINOPHIL NFR BLD AUTO: 3.2 %
ERYTHROCYTE [DISTWIDTH] IN BLOOD BY AUTOMATED COUNT: 14.8 % (ref 11.5–14.5)
GLUCOSE SERPL-MCNC: 84 MG/DL (ref 74–99)
HCT VFR BLD AUTO: 38.2 % (ref 36–46)
HGB BLD-MCNC: 12.9 G/DL (ref 12–16)
IMM GRANULOCYTES # BLD AUTO: 0.06 X10*3/UL (ref 0–0.5)
IMM GRANULOCYTES NFR BLD AUTO: 1.1 % (ref 0–0.9)
LYMPHOCYTES # BLD AUTO: 2.05 X10*3/UL (ref 0.8–3)
LYMPHOCYTES NFR BLD AUTO: 38.9 %
MCH RBC QN AUTO: 33.2 PG (ref 26–34)
MCHC RBC AUTO-ENTMCNC: 33.8 G/DL (ref 32–36)
MCV RBC AUTO: 98 FL (ref 80–100)
MONOCYTES # BLD AUTO: 0.54 X10*3/UL (ref 0.05–0.8)
MONOCYTES NFR BLD AUTO: 10.2 %
NEUTROPHILS # BLD AUTO: 2.41 X10*3/UL (ref 1.6–5.5)
NEUTROPHILS NFR BLD AUTO: 45.8 %
NRBC BLD-RTO: 0 /100 WBCS (ref 0–0)
PLATELET # BLD AUTO: 207 X10*3/UL (ref 150–450)
POTASSIUM SERPL-SCNC: 4.3 MMOL/L (ref 3.5–5.3)
PROT SERPL-MCNC: 6.3 G/DL (ref 6.4–8.2)
RBC # BLD AUTO: 3.89 X10*6/UL (ref 4–5.2)
SODIUM SERPL-SCNC: 136 MMOL/L (ref 136–145)
T4 FREE SERPL-MCNC: 0.88 NG/DL (ref 0.61–1.12)
TSH SERPL-ACNC: 4.85 MIU/L (ref 0.44–3.98)
VALPROATE SERPL-MCNC: 125 UG/ML (ref 50–100)
WBC # BLD AUTO: 5.3 X10*3/UL (ref 4.4–11.3)

## 2024-06-01 PROCEDURE — 82248 BILIRUBIN DIRECT: CPT

## 2024-06-01 PROCEDURE — 80164 ASSAY DIPROPYLACETIC ACD TOT: CPT

## 2024-06-01 PROCEDURE — 36415 COLL VENOUS BLD VENIPUNCTURE: CPT

## 2024-06-01 PROCEDURE — 84443 ASSAY THYROID STIM HORMONE: CPT

## 2024-06-01 PROCEDURE — 85025 COMPLETE CBC W/AUTO DIFF WBC: CPT

## 2024-06-01 PROCEDURE — 80053 COMPREHEN METABOLIC PANEL: CPT

## 2024-06-01 PROCEDURE — 84439 ASSAY OF FREE THYROXINE: CPT

## 2024-06-03 ENCOUNTER — PATIENT MESSAGE (OUTPATIENT)
Dept: PRIMARY CARE | Facility: CLINIC | Age: 74
End: 2024-06-03
Payer: COMMERCIAL

## 2024-06-03 DIAGNOSIS — E03.9 ACQUIRED HYPOTHYROIDISM: Primary | ICD-10-CM

## 2024-06-07 RX ORDER — LEVOTHYROXINE SODIUM 25 UG/1
25 TABLET ORAL DAILY
Qty: 30 TABLET | Refills: 2 | Status: SHIPPED | OUTPATIENT
Start: 2024-06-07 | End: 2025-06-07

## 2024-08-07 ENCOUNTER — APPOINTMENT (OUTPATIENT)
Dept: PRIMARY CARE | Facility: CLINIC | Age: 74
End: 2024-08-07
Payer: COMMERCIAL

## 2024-08-14 ENCOUNTER — APPOINTMENT (OUTPATIENT)
Dept: PRIMARY CARE | Facility: CLINIC | Age: 74
End: 2024-08-14
Payer: COMMERCIAL

## 2024-08-21 ENCOUNTER — APPOINTMENT (OUTPATIENT)
Dept: PRIMARY CARE | Facility: CLINIC | Age: 74
End: 2024-08-21
Payer: COMMERCIAL

## 2024-08-28 ENCOUNTER — PATIENT MESSAGE (OUTPATIENT)
Dept: PRIMARY CARE | Facility: CLINIC | Age: 74
End: 2024-08-28

## 2024-08-28 ENCOUNTER — APPOINTMENT (OUTPATIENT)
Dept: PRIMARY CARE | Facility: CLINIC | Age: 74
End: 2024-08-28
Payer: COMMERCIAL

## 2024-08-29 DIAGNOSIS — I10 PRIMARY HYPERTENSION: ICD-10-CM

## 2024-08-29 DIAGNOSIS — J44.9 CHRONIC OBSTRUCTIVE PULMONARY DISEASE, UNSPECIFIED COPD TYPE (MULTI): ICD-10-CM

## 2024-08-29 DIAGNOSIS — E78.2 MIXED HYPERLIPIDEMIA: ICD-10-CM

## 2024-08-29 DIAGNOSIS — E03.9 ACQUIRED HYPOTHYROIDISM: ICD-10-CM

## 2024-08-29 DIAGNOSIS — G25.81 RESTLESS LEG SYNDROME: ICD-10-CM

## 2024-08-29 DIAGNOSIS — K21.9 GASTROESOPHAGEAL REFLUX DISEASE WITHOUT ESOPHAGITIS: ICD-10-CM

## 2024-08-29 RX ORDER — ALBUTEROL SULFATE 90 UG/1
2 INHALANT RESPIRATORY (INHALATION) EVERY 4 HOURS PRN
Qty: 48 G | Refills: 0 | Status: SHIPPED | OUTPATIENT
Start: 2024-08-29 | End: 2025-08-29

## 2024-08-29 RX ORDER — TIOTROPIUM BROMIDE INHALATION SPRAY 3.12 UG/1
2 SPRAY, METERED RESPIRATORY (INHALATION) DAILY
Qty: 18 G | Refills: 0 | Status: SHIPPED | OUTPATIENT
Start: 2024-08-29 | End: 2024-11-27

## 2024-08-29 RX ORDER — ESOMEPRAZOLE MAGNESIUM 40 MG/1
40 CAPSULE, DELAYED RELEASE ORAL 2 TIMES DAILY
Qty: 180 CAPSULE | Refills: 0 | Status: SHIPPED | OUTPATIENT
Start: 2024-08-29

## 2024-08-29 RX ORDER — ATORVASTATIN CALCIUM 10 MG/1
10 TABLET, FILM COATED ORAL NIGHTLY
Qty: 90 TABLET | Refills: 0 | Status: SHIPPED | OUTPATIENT
Start: 2024-08-29

## 2024-08-29 RX ORDER — LEVOTHYROXINE SODIUM 25 UG/1
25 TABLET ORAL DAILY
Qty: 90 TABLET | Refills: 0 | Status: SHIPPED | OUTPATIENT
Start: 2024-08-29

## 2024-08-29 RX ORDER — GABAPENTIN 100 MG/1
100 CAPSULE ORAL 3 TIMES DAILY
Qty: 270 CAPSULE | Refills: 0 | Status: SHIPPED | OUTPATIENT
Start: 2024-08-29 | End: 2024-11-27

## 2024-08-29 RX ORDER — METOPROLOL SUCCINATE 50 MG/1
50 TABLET, EXTENDED RELEASE ORAL DAILY
Qty: 90 TABLET | Refills: 0 | Status: SHIPPED | OUTPATIENT
Start: 2024-08-29 | End: 2024-11-27

## 2024-08-29 RX ORDER — ACETAMINOPHEN 500 MG
TABLET ORAL
OUTPATIENT
Start: 2024-08-29

## 2024-09-04 ENCOUNTER — APPOINTMENT (OUTPATIENT)
Dept: PRIMARY CARE | Facility: CLINIC | Age: 74
End: 2024-09-04
Payer: COMMERCIAL

## 2024-09-04 DIAGNOSIS — J44.9 CHRONIC OBSTRUCTIVE PULMONARY DISEASE, UNSPECIFIED COPD TYPE (MULTI): ICD-10-CM

## 2024-09-04 DIAGNOSIS — E55.9 VITAMIN D DEFICIENCY: ICD-10-CM

## 2024-09-04 DIAGNOSIS — G25.81 RESTLESS LEG SYNDROME: ICD-10-CM

## 2024-09-04 DIAGNOSIS — R56.9 SEIZURES (MULTI): ICD-10-CM

## 2024-09-04 DIAGNOSIS — E78.5 HYPERLIPIDEMIA, UNSPECIFIED HYPERLIPIDEMIA TYPE: ICD-10-CM

## 2024-09-04 DIAGNOSIS — F31.9 BIPOLAR DEPRESSION (MULTI): ICD-10-CM

## 2024-09-04 DIAGNOSIS — G89.29 CHRONIC BILATERAL LOW BACK PAIN, UNSPECIFIED WHETHER SCIATICA PRESENT: ICD-10-CM

## 2024-09-04 DIAGNOSIS — E04.2 MULTINODULAR GOITER: ICD-10-CM

## 2024-09-04 DIAGNOSIS — K21.9 GASTROESOPHAGEAL REFLUX DISEASE WITHOUT ESOPHAGITIS: ICD-10-CM

## 2024-09-04 DIAGNOSIS — M54.50 CHRONIC BILATERAL LOW BACK PAIN, UNSPECIFIED WHETHER SCIATICA PRESENT: ICD-10-CM

## 2024-09-04 DIAGNOSIS — I10 PRIMARY HYPERTENSION: Primary | ICD-10-CM

## 2024-09-04 PROCEDURE — 1159F MED LIST DOCD IN RCRD: CPT | Performed by: PHYSICIAN ASSISTANT

## 2024-09-04 PROCEDURE — 1160F RVW MEDS BY RX/DR IN RCRD: CPT | Performed by: PHYSICIAN ASSISTANT

## 2024-09-04 PROCEDURE — 99213 OFFICE O/P EST LOW 20 MIN: CPT | Performed by: PHYSICIAN ASSISTANT

## 2024-09-04 NOTE — PROGRESS NOTES
Patient is here for 3-month follow-up.    Mammo: 2022, declined despite my advice and encouragement  Colonoscopy: 2022, javier 3 y  DEXA- declined.      3 mo f/up    HTN- patient is compliant with Metoprolol and does not complain of any chest pain, shortness of breath, lower extremity edema, or palpitations. Patient does check blood pressure at home. Weight is stable.     HLD- patient states he tries to maintain low cholesterol diet and is consistent with taking cholesterol medications.      COPD- patient is continuing to use her Spiriva and denies any worsening shortness of breath above baseline. She still continues to smoke 1/2 pack a day, despite our advice and encouragement to stop. Does not see Pulm. Heat caused COPD to worsen.     GERD- patient states that PPIs working well to control symptoms and denies any breakthrough heartburn. Patient also denies any obvious changes in color of stool and signs of a GI bleed.      Vitamin D deficiency- She is currently only on 2000 units QD OTC.  Declines DEXA scan.      Patient has a history of thyroid nodules-   She had thyroid surgery 2/24/22, need repeat labs. Bx was benign     Bipolar, Depressed mood- Pt being managed by by psychiatry. Patient states her mood is stable.     Pt has low back pain and utilizes naproxen & m/ relaxer to manage sxs.      Seizures- stable on current meds. No recent seizures.     RLS-she is currently using gabapentin but states that the dose needs to be increased because it has not working as well as it used to. She is currently on BID dosing.

## 2024-09-04 NOTE — PROGRESS NOTES
Subjective   Patient ID: Jeane Rivas is a 74 y.o. female who presents for No chief complaint on file..    HPI       Mammo: 2022, declined despite my advice and encouragement  Colonoscopy: 2022, javier 3 y  DEXA- declined.      3 mo f/up    HTN- patient is compliant with Metoprolol and does not complain of any chest pain, shortness of breath, lower extremity edema, or palpitations. Patient does check blood pressure at home. Weight is stable.     HLD- patient states he tries to maintain low cholesterol diet and is consistent with taking cholesterol medications.      COPD- patient is continuing to use her Spiriva and denies any worsening shortness of breath above baseline. She still continues to smoke 1/2 pack a day, despite our advice and encouragement to stop. Does not see Pulm. Heat caused COPD to worsen.     GERD- patient states that PPIs working well to control symptoms and denies any breakthrough heartburn. Patient also denies any obvious changes in color of stool and signs of a GI bleed.      Vitamin D deficiency- She is currently only on 2000 units QD OTC.  Declines DEXA scan.      Patient has a history of thyroid nodules-   She had thyroid surgery 2/24/22, repeat labs done and stable. Bx was benign     Bipolar, Depressed mood- Pt being managed by by psychiatry. Patient states her mood is stable.     Pt has low back pain and utilizes naproxen & m/ relaxer to manage sxs.      Seizures- stable on current meds. No recent seizures.     RLS-she is currently using gabapentin but states that the dose needs to be increased because it has not working as well as it used to. She is currently on 2PM, 1AM dosing.         Review of Systems  Constitutional: Patient denies any fever, chills, loss of appetite, or unexplained weight loss.  Cardiovascular: Patient denies any chest pain, shortness of breath with exertion, tachycardia, palpitations, orthopnea, or paroxysmal nocturnal dyspnea.  Respiratory: Patient denies any  cough, shortness breath, or wheezing.  Gastrointestinal patient denies any nausea, vomiting, diarrhea, constipation, melena, hematochezia, or reflux symptoms  Skin: Denies any rashes or skin lesions   Neurology: Patient denies any new motor or sensory losses.  Denies any numbness, tingling, weakness, and incoordination of the extremities.  Patient also denies any tremor, seizures, or gait instability.  Endocrinology: Denies any polyuria, polydipsia, polyphagia, or heat/cold intolerance.    Objective   There were no vitals taken for this visit. VV she states that her average blood pressure at home is 114/79.    Physical Exam  Gen. appearance: Alert and cooperative, no acute distress, well developed, well-nourished female.  Resp- no acute resp distress  Skin- no rashes of lesions on visible skin surfaces  Neuro- A & O x 3, no tremor      Assessment/Plan   Diagnoses and all orders for this visit:    Mammo: 2022, declined despite my advice and encouragement  Colonoscopy: 2022, javier 3 y  DEXA- declined.      Primary hypertension  -     CBC and Auto Differential; Future  -     Comprehensive Metabolic Panel; Future  Patient will continue all current antihypertensives and we will continue to monitor.  Currently blood pressure stable in the office and patient is without side effects to treatment.  Encouraged patient low salt diet and increase physical activity.     Hyperlipidemia, unspecified hyperlipidemia type  -     Lipid Panel; Future  Patient is to continue her current dose of statin.  Her most recent LDL was 76 back in 12/16/2023.  Triglycerides were 134, and HDL was 40.  She denies any symptoms of unstable angina at this time.    Chronic obstructive pulmonary disease, unspecified COPD type (Multi)-Patient symptoms are stable on Spiriva but she would like to have a rescue inhaler on hand if needed.  Rescue inhaler was prescribed.  She continues to smoke. Patient understands that continued smoking will increase the  risks of lung disease, vascular disease, and multiple malignancies.    Gastroesophageal reflux disease without esophagitis- GERD- patient will continue with PPI use and diet modifications to decrease symptoms of GERD.     Vitamin D deficiency  -     Vitamin D 1,25 Dihydroxy (for eval of hypercalcemia); Future  Patient will continue on her vitamin D supplementation we will continue to monitor.    Multinodular goiter  -     TSH with reflex to Free T4 if abnormal; Future  She had thyroid surgery 2/24/22, need repeat labs. Bx was benign we are just following her TSH.    Bipolar depression (Multi)- patient continues to see a psychiatrist and be medicated.  She denies any symptoms of uncontrolled bipolar depression.  She also denies any thoughts of hurting herself or others.  She will continue to follow with her psychiatrist.    Restless leg syndrome- Patient's symptoms are currently well-controlled on current dose of gabapentin.  She will continue current dosing and we will continue to monitor.      Chronic bilateral low back pain, unspecified whether sciatica present- patient will continue to use muscle relaxers and anti-inflammatories as needed.  Symptoms are currently stable.    Seizures (Multi)stable.  We will continue to monitor.      Pt is aware of the limitations of virtual visits and understands that this could interfere with diagnosis, treatment, and/or prognosis.       Patient is to return to the clinic in 3 months with labs prior to appt.       Patient understands that should they have testing outside   facilities that we may not receive the results and was told to call us if they have not heard from our office within a week after testing.     [facility] uses voice recognition technology for dictations. Sometimes the software misinterprets words. Please take this into account when reading this.

## 2024-12-02 DIAGNOSIS — G25.81 RESTLESS LEG SYNDROME: ICD-10-CM

## 2024-12-02 DIAGNOSIS — E03.9 ACQUIRED HYPOTHYROIDISM: ICD-10-CM

## 2024-12-02 DIAGNOSIS — J44.9 CHRONIC OBSTRUCTIVE PULMONARY DISEASE, UNSPECIFIED COPD TYPE (MULTI): ICD-10-CM

## 2024-12-02 DIAGNOSIS — I10 PRIMARY HYPERTENSION: ICD-10-CM

## 2024-12-02 DIAGNOSIS — K21.9 GASTROESOPHAGEAL REFLUX DISEASE WITHOUT ESOPHAGITIS: ICD-10-CM

## 2024-12-02 RX ORDER — METOPROLOL SUCCINATE 50 MG/1
50 TABLET, EXTENDED RELEASE ORAL DAILY
Qty: 90 TABLET | Refills: 0 | Status: SHIPPED | OUTPATIENT
Start: 2024-12-02 | End: 2025-03-02

## 2024-12-02 RX ORDER — LEVOTHYROXINE SODIUM 25 UG/1
25 TABLET ORAL DAILY
Qty: 90 TABLET | Refills: 0 | Status: SHIPPED | OUTPATIENT
Start: 2024-12-02

## 2024-12-02 RX ORDER — GABAPENTIN 100 MG/1
100 CAPSULE ORAL 3 TIMES DAILY
Qty: 270 CAPSULE | Refills: 0 | Status: SHIPPED | OUTPATIENT
Start: 2024-12-02 | End: 2025-03-02

## 2024-12-02 RX ORDER — ESOMEPRAZOLE MAGNESIUM 40 MG/1
40 CAPSULE, DELAYED RELEASE ORAL 2 TIMES DAILY
Qty: 180 CAPSULE | Refills: 0 | Status: SHIPPED | OUTPATIENT
Start: 2024-12-02

## 2024-12-02 RX ORDER — TIOTROPIUM BROMIDE INHALATION SPRAY 3.12 UG/1
2 SPRAY, METERED RESPIRATORY (INHALATION) DAILY
Qty: 18 G | Refills: 0 | Status: SHIPPED | OUTPATIENT
Start: 2024-12-02 | End: 2025-03-02

## 2024-12-19 DIAGNOSIS — E78.2 MIXED HYPERLIPIDEMIA: ICD-10-CM

## 2024-12-21 RX ORDER — ATORVASTATIN CALCIUM 10 MG/1
10 TABLET, FILM COATED ORAL NIGHTLY
Qty: 90 TABLET | Refills: 0 | Status: SHIPPED | OUTPATIENT
Start: 2024-12-21

## 2025-01-08 ENCOUNTER — APPOINTMENT (OUTPATIENT)
Dept: PRIMARY CARE | Facility: CLINIC | Age: 75
End: 2025-01-08
Payer: COMMERCIAL

## 2025-01-08 ENCOUNTER — LAB (OUTPATIENT)
Dept: LAB | Facility: LAB | Age: 75
End: 2025-01-08
Payer: COMMERCIAL

## 2025-01-08 VITALS
BODY MASS INDEX: 33.49 KG/M2 | HEIGHT: 66 IN | OXYGEN SATURATION: 95 % | HEART RATE: 69 BPM | WEIGHT: 208.4 LBS | TEMPERATURE: 97.3 F | SYSTOLIC BLOOD PRESSURE: 125 MMHG | DIASTOLIC BLOOD PRESSURE: 74 MMHG

## 2025-01-08 DIAGNOSIS — Z79.899 OTHER LONG TERM (CURRENT) DRUG THERAPY: Primary | ICD-10-CM

## 2025-01-08 DIAGNOSIS — E03.9 ACQUIRED HYPOTHYROIDISM: ICD-10-CM

## 2025-01-08 DIAGNOSIS — Z00.00 MEDICARE ANNUAL WELLNESS VISIT, SUBSEQUENT: Primary | ICD-10-CM

## 2025-01-08 DIAGNOSIS — G56.22 ULNAR NEUROPATHY AT ELBOW OF LEFT UPPER EXTREMITY: ICD-10-CM

## 2025-01-08 DIAGNOSIS — E55.9 VITAMIN D DEFICIENCY: ICD-10-CM

## 2025-01-08 DIAGNOSIS — J44.9 CHRONIC OBSTRUCTIVE PULMONARY DISEASE, UNSPECIFIED COPD TYPE (MULTI): ICD-10-CM

## 2025-01-08 DIAGNOSIS — I10 PRIMARY HYPERTENSION: ICD-10-CM

## 2025-01-08 DIAGNOSIS — G25.81 RESTLESS LEG SYNDROME: ICD-10-CM

## 2025-01-08 DIAGNOSIS — Z78.0 POSTMENOPAUSAL: ICD-10-CM

## 2025-01-08 DIAGNOSIS — G56.01 CARPAL TUNNEL SYNDROME OF RIGHT WRIST: ICD-10-CM

## 2025-01-08 DIAGNOSIS — E78.5 HYPERLIPIDEMIA, UNSPECIFIED HYPERLIPIDEMIA TYPE: ICD-10-CM

## 2025-01-08 DIAGNOSIS — R56.9 SEIZURES (MULTI): ICD-10-CM

## 2025-01-08 DIAGNOSIS — K21.9 GASTROESOPHAGEAL REFLUX DISEASE WITHOUT ESOPHAGITIS: ICD-10-CM

## 2025-01-08 DIAGNOSIS — E78.2 MIXED HYPERLIPIDEMIA: ICD-10-CM

## 2025-01-08 DIAGNOSIS — E04.2 MULTINODULAR GOITER: ICD-10-CM

## 2025-01-08 LAB
ALBUMIN SERPL BCP-MCNC: 3.8 G/DL (ref 3.4–5)
ALP SERPL-CCNC: 44 U/L (ref 33–136)
ALT SERPL W P-5'-P-CCNC: 5 U/L (ref 7–45)
ANION GAP SERPL CALC-SCNC: 7 MMOL/L (ref 10–20)
AST SERPL W P-5'-P-CCNC: 10 U/L (ref 9–39)
BASOPHILS # BLD AUTO: 0.03 X10*3/UL (ref 0–0.1)
BASOPHILS NFR BLD AUTO: 0.6 %
BILIRUB SERPL-MCNC: 0.4 MG/DL (ref 0–1.2)
BUN SERPL-MCNC: 16 MG/DL (ref 6–23)
CALCIUM SERPL-MCNC: 9.2 MG/DL (ref 8.6–10.3)
CHLORIDE SERPL-SCNC: 105 MMOL/L (ref 98–107)
CHOLEST SERPL-MCNC: 157 MG/DL (ref 0–199)
CHOLESTEROL/HDL RATIO: 4.2
CO2 SERPL-SCNC: 28 MMOL/L (ref 21–32)
CREAT SERPL-MCNC: 1 MG/DL (ref 0.5–1.05)
EGFRCR SERPLBLD CKD-EPI 2021: 60 ML/MIN/1.73M*2
EOSINOPHIL # BLD AUTO: 0.12 X10*3/UL (ref 0–0.4)
EOSINOPHIL NFR BLD AUTO: 2.5 %
ERYTHROCYTE [DISTWIDTH] IN BLOOD BY AUTOMATED COUNT: 14.4 % (ref 11.5–14.5)
GLUCOSE SERPL-MCNC: 93 MG/DL (ref 74–99)
HCT VFR BLD AUTO: 36.9 % (ref 36–46)
HDLC SERPL-MCNC: 37.1 MG/DL
HGB BLD-MCNC: 12.3 G/DL (ref 12–16)
IMM GRANULOCYTES # BLD AUTO: 0.03 X10*3/UL (ref 0–0.5)
IMM GRANULOCYTES NFR BLD AUTO: 0.6 % (ref 0–0.9)
LDLC SERPL CALC-MCNC: 83 MG/DL
LYMPHOCYTES # BLD AUTO: 1.51 X10*3/UL (ref 0.8–3)
LYMPHOCYTES NFR BLD AUTO: 31.7 %
MCH RBC QN AUTO: 32.5 PG (ref 26–34)
MCHC RBC AUTO-ENTMCNC: 33.3 G/DL (ref 32–36)
MCV RBC AUTO: 97 FL (ref 80–100)
MONOCYTES # BLD AUTO: 0.51 X10*3/UL (ref 0.05–0.8)
MONOCYTES NFR BLD AUTO: 10.7 %
NEUTROPHILS # BLD AUTO: 2.56 X10*3/UL (ref 1.6–5.5)
NEUTROPHILS NFR BLD AUTO: 53.9 %
NON HDL CHOLESTEROL: 120 MG/DL (ref 0–149)
NRBC BLD-RTO: 0 /100 WBCS (ref 0–0)
PLATELET # BLD AUTO: 226 X10*3/UL (ref 150–450)
POTASSIUM SERPL-SCNC: 4.4 MMOL/L (ref 3.5–5.3)
PROT SERPL-MCNC: 6.1 G/DL (ref 6.4–8.2)
RBC # BLD AUTO: 3.79 X10*6/UL (ref 4–5.2)
SODIUM SERPL-SCNC: 136 MMOL/L (ref 136–145)
TRIGL SERPL-MCNC: 185 MG/DL (ref 0–149)
TSH SERPL-ACNC: 1.91 MIU/L (ref 0.44–3.98)
VALPROATE SERPL-MCNC: 72 UG/ML (ref 50–100)
VLDL: 37 MG/DL (ref 0–40)
WBC # BLD AUTO: 4.8 X10*3/UL (ref 4.4–11.3)

## 2025-01-08 PROCEDURE — G0439 PPPS, SUBSEQ VISIT: HCPCS | Performed by: PHYSICIAN ASSISTANT

## 2025-01-08 PROCEDURE — 82652 VIT D 1 25-DIHYDROXY: CPT

## 2025-01-08 PROCEDURE — 99214 OFFICE O/P EST MOD 30 MIN: CPT | Performed by: PHYSICIAN ASSISTANT

## 2025-01-08 PROCEDURE — 80164 ASSAY DIPROPYLACETIC ACD TOT: CPT

## 2025-01-08 PROCEDURE — 84443 ASSAY THYROID STIM HORMONE: CPT

## 2025-01-08 PROCEDURE — 3078F DIAST BP <80 MM HG: CPT | Performed by: PHYSICIAN ASSISTANT

## 2025-01-08 PROCEDURE — 1160F RVW MEDS BY RX/DR IN RCRD: CPT | Performed by: PHYSICIAN ASSISTANT

## 2025-01-08 PROCEDURE — 80061 LIPID PANEL: CPT

## 2025-01-08 PROCEDURE — 36415 COLL VENOUS BLD VENIPUNCTURE: CPT

## 2025-01-08 PROCEDURE — 1159F MED LIST DOCD IN RCRD: CPT | Performed by: PHYSICIAN ASSISTANT

## 2025-01-08 PROCEDURE — 80053 COMPREHEN METABOLIC PANEL: CPT

## 2025-01-08 PROCEDURE — 85025 COMPLETE CBC W/AUTO DIFF WBC: CPT

## 2025-01-08 PROCEDURE — 1124F ACP DISCUSS-NO DSCNMKR DOCD: CPT | Performed by: PHYSICIAN ASSISTANT

## 2025-01-08 PROCEDURE — 3008F BODY MASS INDEX DOCD: CPT | Performed by: PHYSICIAN ASSISTANT

## 2025-01-08 PROCEDURE — 3074F SYST BP LT 130 MM HG: CPT | Performed by: PHYSICIAN ASSISTANT

## 2025-01-08 PROCEDURE — 1170F FXNL STATUS ASSESSED: CPT | Performed by: PHYSICIAN ASSISTANT

## 2025-01-08 RX ORDER — DIVALPROEX SODIUM 500 MG/1
500 TABLET, FILM COATED, EXTENDED RELEASE ORAL DAILY
Qty: 180 TABLET | Refills: 0 | Status: CANCELLED | OUTPATIENT
Start: 2025-01-08

## 2025-01-08 RX ORDER — AMITRIPTYLINE HYDROCHLORIDE 25 MG/1
50 TABLET, FILM COATED ORAL NIGHTLY
Qty: 90 TABLET | Refills: 0 | Status: CANCELLED | OUTPATIENT
Start: 2025-01-08

## 2025-01-08 RX ORDER — ATORVASTATIN CALCIUM 10 MG/1
10 TABLET, FILM COATED ORAL NIGHTLY
Qty: 90 TABLET | Refills: 0 | Status: SHIPPED | OUTPATIENT
Start: 2025-01-08

## 2025-01-08 RX ORDER — GABAPENTIN 300 MG/1
300 CAPSULE ORAL 3 TIMES DAILY
Qty: 270 CAPSULE | Refills: 0 | Status: SHIPPED | OUTPATIENT
Start: 2025-01-08 | End: 2025-04-08

## 2025-01-08 RX ORDER — METOPROLOL SUCCINATE 50 MG/1
50 TABLET, EXTENDED RELEASE ORAL DAILY
Qty: 90 TABLET | Refills: 0 | Status: SHIPPED | OUTPATIENT
Start: 2025-01-08 | End: 2025-04-08

## 2025-01-08 RX ORDER — ESOMEPRAZOLE MAGNESIUM 40 MG/1
40 CAPSULE, DELAYED RELEASE ORAL 2 TIMES DAILY
Qty: 180 CAPSULE | Refills: 0 | Status: SHIPPED | OUTPATIENT
Start: 2025-01-08

## 2025-01-08 RX ORDER — ALBUTEROL SULFATE 90 UG/1
2 INHALANT RESPIRATORY (INHALATION) EVERY 4 HOURS PRN
Qty: 48 G | Refills: 1 | Status: SHIPPED | OUTPATIENT
Start: 2025-01-08 | End: 2026-01-08

## 2025-01-08 ASSESSMENT — ACTIVITIES OF DAILY LIVING (ADL)
MANAGING_FINANCES: INDEPENDENT
BATHING: INDEPENDENT
DRESSING: INDEPENDENT
GROCERY_SHOPPING: INDEPENDENT
DOING_HOUSEWORK: INDEPENDENT
TAKING_MEDICATION: INDEPENDENT

## 2025-01-08 NOTE — PROGRESS NOTES
Subjective   Reason for Visit: Jenae Rivas is an 74 y.o. female here for a follow up and a  Medicare Wellness visit.               HPI    Pt c/o ulna nerve pinched causing fingers to numb.    Pt c/o arthitis or trigger finger on the point finger on the right hand.     Pt also c/o numbness in the right  hand numbness when waking up from sleep.    MCW:  Labs:  2025- in process  Mammo: 2022- Due but she declines  Colon: , javier   AAA: (smoke >100 cig as a man or fm Hx) declined  Screening DM: today, pending  Mammo: , declined   Pap: n/a  Dexa: agreed to have one done today  Lipids: today, pending  EK/22  Carotids (smokers):   Low dose CT chest (smokers): 10/20  Tobacco Cessation: declined      3 mo f/up    HTN- patient is compliant with Metoprolol and does not complain of any chest pain, shortness of breath, lower extremity edema, or palpitations. Patient does check blood pressure at home. Weight is stable.     HLD- patient states he tries to maintain low cholesterol diet and is consistent with taking cholesterol medications.      COPD- patient is continuing to use her Spiriva and denies any worsening shortness of breath above baseline. She still continues to smoke 1/2 pack a day, despite our advice and encouragement to stop. Does not see Pulm.  Pt wants to come off of the Spirva, it doesn't help.      GERD- patient states that PPIs working well to control symptoms and denies any breakthrough heartburn. Patient also denies any obvious changes in color of stool and signs of a GI bleed.      Vitamin D deficiency- She is currently only on 2000 units QD OTC.  She is ok w/ getting a DEXA.      Patient has a history of thyroid nodules-   She had thyroid surgery 22, need repeat labs. Bx was benign     Bipolar, Depressed mood- Pt being managed by by psychiatry. Patient states her mood is stable but she does have some seasonal affective disorder.      Pt has low back pain and utilizes  "naproxen & m/ relaxer to manage sxs.      Seizures- stable on current meds. No recent seizure.      RLS-she is currently using gabapentin but states that the dose needs to be increased because it has not working as well as it used to. She is currently on BID dosing.        Patient Self Assessment of Health Status  Patient Self Assessment: Good    Nutrition and Exercise  Current Diet: Unhealthy Diet  Adequate Fluid Intake: Yes  Caffeine: Yes  Exercise Frequency: No Exercise    Functional Ability/Level of Safety  Cognitive Impairment Observed: No cognitive impairment observed    Home Safety Risk Factors: None    Patient Care Team:  Yanni Womack PA-C as PCP - General     Review of Systems  Constitutional: Patient denies any fever, chills, loss of appetite, or unexplained weight loss.  Cardiovascular: Patient denies any chest pain, shortness of breath with exertion, tachycardia, palpitations, orthopnea, or paroxysmal nocturnal dyspnea.  Respiratory: Patient denies any cough, shortness breath, or wheezing.  Gastrointestinal patient denies any nausea, vomiting, diarrhea, constipation, melena, hematochezia, or reflux symptoms  Skin: Denies any rashes or skin lesions   Neurology: Patient denies any new motor or sensory losses.  Denies any numbness, tingling, weakness, and incoordination of the extremities.  Patient also denies any tremor, seizures, or gait instability.  Endocrinology: Denies any polyuria, polydipsia, polyphagia, or heat/cold intolerance.    Objective   Vitals:  /74   Pulse 69   Temp 36.3 °C (97.3 °F) (Temporal)   Ht 1.676 m (5' 6\")   Wt 94.5 kg (208 lb 6.4 oz)   SpO2 95%   BMI 33.64 kg/m²       Physical Exam  Gen. appearance: Alert and cooperative, no acute distress, well-developed, well-nourished elderly female.  Neck: Supple and without adenopathy or rigidity.  There is no JVD at 90° and no carotid bruits are noted.  Cardiovascular: Heart has a regular rate and rhythm without murmur or " ectopy.  Respiratory: Lungs are clear to auscultation bilaterally with good air exchange.    Assessment/Plan   1. Medicare annual wellness visit, subsequent (Primary)    MCW:  Labs:  2025- in process  Mammo: 2022- Due but she declines  Colon: , javier   AAA: (smoke >100 cig as a man or fm Hx) declined  Screening DM: today, pending  Mammo: , declined   Pap: n/a  Dexa: agreed to have one done today  Lipids: today, pending  EK/22  Carotids (smokers):   Low dose CT chest (smokers): 10/20  Tobacco Cessation: declined    2. Primary hypertension  HTN-patient will continue all current antihypertensives and we will continue to monitor.  Currently blood pressure stable in the office and patient is without side effects to treatment.  Encouraged patient low salt diet and increase physical activity.    - Follow Up In [facility] - PCP - Established  - metoprolol succinate XL (Toprol-XL) 50 mg 24 hr tablet; Take 1 tablet (50 mg) by mouth once daily.  Dispense: 90 tablet; Refill: 0  - Follow Up In [facility] - PCP - Established; Future    3. Chronic obstructive pulmonary disease, unspecified COPD type (Multi)  Patient's symptoms are stable, placard was provided. She will con't with current inhalers and tx.  - albuterol (Ventolin HFA) 90 mcg/actuation inhaler; Inhale 2 puffs every 4 hours if needed for wheezing or shortness of breath.  Dispense: 48 g; Refill: 1  - Disability Placard      4. Gastroesophageal reflux disease without esophagitis  GERD- patient will continue with PPI use and diet modifications to decrease symptoms of GERD.  - esomeprazole (NexIUM) 40 mg DR capsule; Take 1 capsule (40 mg) by mouth 2 times a day. Do not open capsule.  Dispense: 180 capsule; Refill: 0    5. Mixed hyperlipidemia  Patient tries to watch amount of cholesterol in her diet.  She is compliant with Lipitor 10 mg use.  Most recent LDL was 83 with triglycerides .  He currently denies any symptoms  of unstable angina  - atorvastatin (Lipitor) 10 mg tablet; Take 1 tablet (10 mg) by mouth once daily at bedtime.  Dispense: 90 tablet; Refill: 0    6. Restless leg syndrome  Patient symptoms are not stable.  She would like an increase on her gabapentin.  Gabapentin was increased and we will follow-up with her in 3 months to see if this was effective.  OARRS was reviewed.  No sign of abuse obvious.  - gabapentin (Neurontin) 300 mg capsule; Take 1 capsule (300 mg) by mouth 3 times a day.  Dispense: 270 capsule; Refill: 0    7. Carpal tunnel syndrome of right wrist  Symptoms been present for over 6 weeks, progressive worsening.  - EMG & nerve conduction; Future    8. Ulnar neuropathy at elbow of left upper extremity  Symptoms present for over 6 weeks with progressive worsening  - EMG & nerve conduction; Future    9. Seizures (Multi)  Symptoms are stable.  She will continue current medications.    10. Postmenopausal  Pt has agreed to a bone density so that was ordered and scheduled before she left the office today.  - XR DEXA bone density; Future     Patient is to return to the clinic in months for 6-month follow-up with labs prior.    Patient understands that should they have testing outside   facilities that we may not receive the results and was told to call us if they have not heard from our office within a week after testing.    [facility] uses voice recognition technology for dictations. Sometimes the software misinterprets words. Please take this into account when reading this.

## 2025-01-10 LAB — 1,25(OH)2D SERPL-MCNC: 45.1 PG/ML (ref 19.9–79.3)

## 2025-01-29 ENCOUNTER — HOSPITAL ENCOUNTER (EMERGENCY)
Age: 75
Discharge: HOME | End: 2025-01-29
Attending: EMERGENCY MEDICINE
Payer: MEDICARE

## 2025-01-29 VITALS
HEART RATE: 80 BPM | TEMPERATURE: 97.7 F | DIASTOLIC BLOOD PRESSURE: 77 MMHG | WEIGHT: 210 LBS | BODY MASS INDEX: 39.65 KG/M2 | OXYGEN SATURATION: 97 % | RESPIRATION RATE: 18 BRPM | SYSTOLIC BLOOD PRESSURE: 104 MMHG | HEIGHT: 61 IN

## 2025-01-29 DIAGNOSIS — H11.31 CONJUNCTIVAL HEMORRHAGE OF RIGHT EYE: Primary | ICD-10-CM

## 2025-01-29 PROCEDURE — 99283 EMERGENCY DEPT VISIT LOW MDM: CPT

## 2025-01-29 PROCEDURE — 6370000000 HC RX 637 (ALT 250 FOR IP): Performed by: EMERGENCY MEDICINE

## 2025-01-29 RX ADMIN — FLUORESCEIN SODIUM 1 MG: 1 STRIP OPHTHALMIC at 21:58

## 2025-01-29 ASSESSMENT — LIFESTYLE VARIABLES
HOW OFTEN DO YOU HAVE A DRINK CONTAINING ALCOHOL: NEVER
HOW MANY STANDARD DRINKS CONTAINING ALCOHOL DO YOU HAVE ON A TYPICAL DAY: PATIENT DOES NOT DRINK

## 2025-01-29 ASSESSMENT — ENCOUNTER SYMPTOMS
ABDOMINAL PAIN: 0
SORE THROAT: 0
EYE REDNESS: 1
EYE DISCHARGE: 0
SHORTNESS OF BREATH: 0
NAUSEA: 0
VOMITING: 0
BACK PAIN: 0
COUGH: 0

## 2025-01-29 ASSESSMENT — PAIN SCALES - GENERAL: PAINLEVEL_OUTOF10: 8

## 2025-01-29 ASSESSMENT — PAIN DESCRIPTION - ORIENTATION: ORIENTATION: RIGHT

## 2025-01-29 ASSESSMENT — PAIN DESCRIPTION - PAIN TYPE: TYPE: ACUTE PAIN

## 2025-01-29 ASSESSMENT — PAIN DESCRIPTION - LOCATION: LOCATION: EYE

## 2025-01-29 ASSESSMENT — VISUAL ACUITY: OU: 1

## 2025-01-30 NOTE — ED PROVIDER NOTES
[facility] EMERGENCY DEPARTMENT  EMERGENCY DEPARTMENT ENCOUNTER      Pt Name: Lianne Arnold  MRN: [id number]  Birthdate 1950  Date of evaluation: 1/29/2025  Provider: JUSTIN SIMPSON DO    CHIEF COMPLAINT       Chief Complaint   Patient presents with    Blood in eye.     Developed blood right eye earlier today. No known history of trauma.         HISTORY OF PRESENT ILLNESS   (Location/Symptom, Timing/Onset, Context/Setting, Quality, Duration, Modifying Factors, Severity)  Note limiting factors.   Lianne Arnold is a 74 y.o. female who presents to the emergency department right eye blood in his eye.  No trauma.     The history is provided by the patient.   Eye Problem  Location:  Right eye  Quality:  Stinging  Severity:  Mild  Onset quality:  Sudden  Timing:  Constant  Progression:  Worsening  Chronicity:  New  Context: not direct trauma    Relieved by:  Nothing  Worsened by:  Nothing  Ineffective treatments:  None tried  Associated symptoms: redness    Associated symptoms: no discharge, no nausea and no vomiting        Nursing Notes were reviewed.    REVIEW OF SYSTEMS    (2-9 systems for level 4, 10 or more for level 5)     Review of Systems   Constitutional:  Negative for chills and fever.   HENT:  Negative for ear pain and sore throat.    Eyes:  Positive for redness. Negative for discharge.   Respiratory:  Negative for cough and shortness of breath.    Cardiovascular:  Negative for chest pain and palpitations.   Gastrointestinal:  Negative for abdominal pain, nausea and vomiting.   Genitourinary:  Negative for difficulty urinating and dysuria.   Musculoskeletal:  Negative for back pain and neck pain.   Skin:  Negative for rash and wound.   Neurological:  Negative for dizziness and syncope.   Psychiatric/Behavioral:  Negative for confusion. The patient is not nervous/anxious.    All other systems reviewed and are negative.      Except as noted above the remainder of the review of systems was reviewed and

## 2025-07-09 ENCOUNTER — APPOINTMENT (OUTPATIENT)
Dept: PRIMARY CARE | Facility: CLINIC | Age: 75
End: 2025-07-09
Payer: COMMERCIAL

## 2025-07-09 VITALS
SYSTOLIC BLOOD PRESSURE: 120 MMHG | HEART RATE: 71 BPM | HEIGHT: 66 IN | OXYGEN SATURATION: 96 % | TEMPERATURE: 97.7 F | WEIGHT: 204 LBS | DIASTOLIC BLOOD PRESSURE: 64 MMHG | RESPIRATION RATE: 16 BRPM | BODY MASS INDEX: 32.78 KG/M2

## 2025-07-09 DIAGNOSIS — K21.9 GASTROESOPHAGEAL REFLUX DISEASE WITHOUT ESOPHAGITIS: ICD-10-CM

## 2025-07-09 DIAGNOSIS — I10 PRIMARY HYPERTENSION: Primary | ICD-10-CM

## 2025-07-09 DIAGNOSIS — E03.9 ACQUIRED HYPOTHYROIDISM: ICD-10-CM

## 2025-07-09 DIAGNOSIS — E55.9 VITAMIN D DEFICIENCY: ICD-10-CM

## 2025-07-09 DIAGNOSIS — G25.81 RESTLESS LEG SYNDROME: ICD-10-CM

## 2025-07-09 DIAGNOSIS — E78.2 MIXED HYPERLIPIDEMIA: ICD-10-CM

## 2025-07-09 DIAGNOSIS — J44.9 CHRONIC OBSTRUCTIVE PULMONARY DISEASE, UNSPECIFIED COPD TYPE (MULTI): ICD-10-CM

## 2025-07-09 DIAGNOSIS — E78.5 HYPERLIPIDEMIA, UNSPECIFIED HYPERLIPIDEMIA TYPE: ICD-10-CM

## 2025-07-09 PROCEDURE — 1160F RVW MEDS BY RX/DR IN RCRD: CPT | Performed by: PHYSICIAN ASSISTANT

## 2025-07-09 PROCEDURE — 3074F SYST BP LT 130 MM HG: CPT | Performed by: PHYSICIAN ASSISTANT

## 2025-07-09 PROCEDURE — 99214 OFFICE O/P EST MOD 30 MIN: CPT | Performed by: PHYSICIAN ASSISTANT

## 2025-07-09 PROCEDURE — 1159F MED LIST DOCD IN RCRD: CPT | Performed by: PHYSICIAN ASSISTANT

## 2025-07-09 PROCEDURE — 3078F DIAST BP <80 MM HG: CPT | Performed by: PHYSICIAN ASSISTANT

## 2025-07-09 PROCEDURE — 3008F BODY MASS INDEX DOCD: CPT | Performed by: PHYSICIAN ASSISTANT

## 2025-07-09 PROCEDURE — G2211 COMPLEX E/M VISIT ADD ON: HCPCS | Performed by: PHYSICIAN ASSISTANT

## 2025-07-09 RX ORDER — TIMOLOL MALEATE 5 MG/ML
1 SOLUTION/ DROPS OPHTHALMIC DAILY
COMMUNITY
Start: 2025-04-23

## 2025-07-09 RX ORDER — METOPROLOL SUCCINATE 50 MG/1
50 TABLET, EXTENDED RELEASE ORAL DAILY
Qty: 90 TABLET | Refills: 1 | Status: SHIPPED | OUTPATIENT
Start: 2025-07-09 | End: 2026-01-05

## 2025-07-09 RX ORDER — LEVOTHYROXINE SODIUM 25 UG/1
25 TABLET ORAL DAILY
Qty: 90 TABLET | Refills: 1 | Status: SHIPPED | OUTPATIENT
Start: 2025-07-09

## 2025-07-09 RX ORDER — GABAPENTIN 300 MG/1
300 CAPSULE ORAL 3 TIMES DAILY
Qty: 270 CAPSULE | Refills: 0 | Status: SHIPPED | OUTPATIENT
Start: 2025-07-09 | End: 2025-10-07

## 2025-07-09 RX ORDER — ALBUTEROL SULFATE 90 UG/1
2 INHALANT RESPIRATORY (INHALATION) EVERY 4 HOURS PRN
Qty: 48 G | Refills: 5 | Status: SHIPPED | OUTPATIENT
Start: 2025-07-09 | End: 2026-07-09

## 2025-07-09 RX ORDER — ESOMEPRAZOLE MAGNESIUM 40 MG/1
40 CAPSULE, DELAYED RELEASE ORAL 2 TIMES DAILY
Qty: 180 CAPSULE | Refills: 1 | Status: SHIPPED | OUTPATIENT
Start: 2025-07-09

## 2025-07-09 RX ORDER — ATORVASTATIN CALCIUM 10 MG/1
10 TABLET, FILM COATED ORAL NIGHTLY
Qty: 90 TABLET | Refills: 1 | Status: SHIPPED | OUTPATIENT
Start: 2025-07-09

## 2025-07-09 RX ORDER — BIMATOPROST 0.1 MG/ML
1 SOLUTION/ DROPS OPHTHALMIC DAILY
COMMUNITY
Start: 2025-06-07

## 2025-07-09 ASSESSMENT — PATIENT HEALTH QUESTIONNAIRE - PHQ9
2. FEELING DOWN, DEPRESSED OR HOPELESS: NOT AT ALL
1. LITTLE INTEREST OR PLEASURE IN DOING THINGS: NOT AT ALL
SUM OF ALL RESPONSES TO PHQ9 QUESTIONS 1 AND 2: 0

## 2025-07-09 NOTE — PROGRESS NOTES
"Subjective   Patient ID: May Rivas is a 75 y.o. female who presents for Follow-up (6month follow up/Concerns about left elbow pain - recent elbow and CTS surgery in 4/2025/Also about issues with right hand after CTS surgery in 5/2025/Has upcoming appt with surgeon Carole).    HPI       No recent BW, due  Mammo: 08/2022- Due; ordered in June but not done, now she \"doesn't want one\"  Colon screen: 2022, due this yr  order in system, pt wants to call on her own.   DEXA 3/25, NL javier 3 y  9# increase in weight  Seeing elbow surgeon tomorrow. Having issues with L elbow painful  Declined cardiac calcium score    6 mo f/up:  HTN- patient is compliant with Metoprolol and does not complain of any chest pain, shortness of breath, lower extremity edema, or palpitations. Patient does check blood pressure at home. Weight is stable.     HLD- patient states he tries to maintain low cholesterol diet and is consistent with taking cholesterol medications.      COPD- patient is continuing to use her Spiriva and denies any worsening shortness of breath above baseline. She still continues to smoke 1/2 pack a day, despite our advice and encouragement to stop. Does not see Pulm.  Pt wants to come off of the Spirva, it doesn't help.      GERD- patient states that PPIs working well to control symptoms and denies any breakthrough heartburn. Patient also denies any obvious changes in color of stool and signs of a GI bleed.      Vitamin D deficiency- She is currently only on 2000 units QD OTC.  She is ok w/ getting a DEXA.      Patient has a history of thyroid nodules-   She had thyroid surgery 2/24/22, need repeat labs. Bx was benign     Bipolar, Depressed mood- Pt being managed by by psychiatry. Patient states her mood is stable but she does have some seasonal affective disorder.      Pt has low back pain and utilizes naproxen & m/ relaxer to manage sxs.      Seizures- stable on current meds. No recent seizure.      RLS-she is " "currently using gabapentin but states that the dose needs to be increased because it has not working as well as it used to. She is currently on BID dosing.     Review of Systems  Constitutional: Patient denies any fever, chills, loss of appetite, or unexplained weight loss.  Cardiovascular: Patient denies any chest pain, shortness of breath with exertion, tachycardia, palpitations, orthopnea, or paroxysmal nocturnal dyspnea.  Respiratory: Patient denies any cough, shortness breath, or wheezing.  Gastrointestinal patient denies any nausea, vomiting, diarrhea, constipation, melena, hematochezia, or reflux symptoms  Skin: Denies any rashes or skin lesions   Neurology: Patient denies any new motor or sensory losses.  Denies any numbness, tingling, weakness, and incoordination of the extremities.  Patient also denies any tremor, seizures, or gait instability.  Endocrinology: Denies any polyuria, polydipsia, polyphagia, or heat/cold intolerance.    Objective   /64   Pulse 71   Temp 36.5 °C (97.7 °F) (Temporal)   Resp 16   Ht 1.664 m (5' 5.5\")   Wt 92.5 kg (204 lb)   SpO2 96%   BMI 33.43 kg/m²     Physical Exam  Gen. appearance: Alert and cooperative, in no acute distress, well-developed, well-nourished female.  Neck: Supple and without adenopathy or rigidity.  There is no JVD at 90° and no carotid bruits are noted.  There is no thyromegaly, thyroid tenderness, or palpable thyroid nodules.  Heart: Regular rate and rhythm without murmur or ectopy.  Lungs: Lungs are clear to auscultation bilaterally with good air exchange.  Skin: Good turgor, moist, warm and without rashes or lesions.  Neurological exam: Alert and oriented ×3, no tremor, normal gait.  Extremities: No clubbing, cyanosis, or edema    Assessment/Plan   Diagnoses and all orders for this visit:    No recent BW, due  Mammo: 08/2022- Due; ordered in June but not done, now she \"doesn't want one\"  Colon screen: 2022, due this yr  order in system, pt " wants to call on her own.   DEXA 3/25, NL javier 3 y  9# increase in weight  Seeing elbow surgeon tomorrow. Having issues with L elbow painful  Declined cardiac calcium score    Primary hypertension  -     Follow Up In [facility] - PCP - Established  -     CBC and Auto Differential; Future  -     Follow Up In [facility] - PCP - Established; Future  -     metoprolol succinate XL (Toprol-XL) 50 mg 24 hr tablet; Take 1 tablet (50 mg) by mouth once daily.  Patient's blood pressure is well-controlled today.  She will continue current dosing metoprolol and we will continue to monitor.    Hyperlipidemia, unspecified hyperlipidemia type  -     Comprehensive Metabolic Panel; Future  -     Lipid Panel; Future  -     TSH with reflex to Free T4 if abnormal; Future  -     atorvastatin (Lipitor) 10 mg tablet; Take 1 tablet (10 mg) by mouth once daily at bedtime.  Patient is due to have a repeat lipid level and that will be done prior to her next appointment.  She denies any symptoms of unstable angina and declines a cardiac calcium score today.  She will continue current dosing of Lipitor and we will continue to monitor.  Last LDL was 1/25 and her LDL was 83.    Acquired hypothyroidism  -     levothyroxine (Synthroid, Levoxyl) 25 mcg tablet; Take 1 tablet (25 mcg) by mouth early in the morning.. Take on an empty stomach at the same time each day, either 30 to 60 minutes prior to breakfast  Last TSH was within normal limits.  She will continue current dosing of her Synthroid and we will continue to monitor.  She denies any skin or hair changes, stool changes    Gastroesophageal reflux disease without esophagitis  -     esomeprazole (NexIUM) 40 mg DR capsule; Take 1 capsule (40 mg) by mouth 2 times a day. Do not open capsule.  Symptoms are well-managed on Nexium.  She will continue current dosing.    Chronic obstructive pulmonary disease, unspecified COPD type (Multi)  -     albuterol (Ventolin HFA) 90  mcg/actuation inhaler; Inhale 2 puffs every 4 hours if needed for wheezing or shortness of breath.  Patient denies any worsening shortness of breath.  She is to continue with all inhalers.  Refill was provided of albuterol today.  She still continues to smoke against advice to stop.  Patient understands that continued smoking will increase the risks of lung disease, vascular disease, and multiple malignancies.    Vitamin D deficiency  -     Vitamin D 1,25 Dihydroxy (for eval of hypercalcemia); Future  Patient is currently on OTC supplementation    Restless leg syndrome  -     gabapentin (Neurontin) 300 mg capsule; Take 1 capsule (300 mg) by mouth 3 times a day.  Patient's symptoms are stable.  She is to continue dosing.  OARRS was reviewed without concern for abuse    Patient is to return to the clinic in 6 months, she does not want to be seen any sooner than that despite my advice and encouragement for her to come every 3 months.  Labs to be done prior.    Patient understands that should they have testing outside   facilities that we may not receive the results and was told to call us if they have not heard from our office within a week after testing.     Please be aware that any referrals discussed today should be placed today within our office.    Tests last labs ordered should also result in prompt scheduling today as you leave the office.  If not done today then a phone call for scheduling is expected in a timely manner-within 2 weeks.  If testing is done-a result should be available to patient within 2 weeks time unless otherwise specified.   You, the patient or caregiver, are responsible for making sure what is discussed is actually scheduled and completed.  If suboptimal understanding of results, tests, referral reasons occurs it is understood that a follow-up appointment with me should be made to discuss and clarify the understanding.  Follow-up at next scheduled visit as planned or discussed during visit  is expected.  You are to return sooner if new or unresolved issues of concern occur.        [facility] uses voice recognition technology for dictations. Sometimes the software misinterprets words. Please take this into account when reading this.